# Patient Record
Sex: MALE | Race: WHITE | NOT HISPANIC OR LATINO
[De-identification: names, ages, dates, MRNs, and addresses within clinical notes are randomized per-mention and may not be internally consistent; named-entity substitution may affect disease eponyms.]

---

## 2017-02-24 ENCOUNTER — LABORATORY RESULT (OUTPATIENT)
Age: 82
End: 2017-02-24

## 2017-02-24 ENCOUNTER — APPOINTMENT (OUTPATIENT)
Dept: INTERNAL MEDICINE | Facility: CLINIC | Age: 82
End: 2017-02-24

## 2017-02-24 VITALS — DIASTOLIC BLOOD PRESSURE: 52 MMHG | SYSTOLIC BLOOD PRESSURE: 130 MMHG

## 2017-02-24 VITALS
BODY MASS INDEX: 29.76 KG/M2 | TEMPERATURE: 96.7 F | DIASTOLIC BLOOD PRESSURE: 60 MMHG | SYSTOLIC BLOOD PRESSURE: 130 MMHG | WEIGHT: 190 LBS

## 2017-02-24 DIAGNOSIS — N28.9 DISORDER OF KIDNEY AND URETER, UNSPECIFIED: ICD-10-CM

## 2017-03-04 LAB
ALBUMIN SERPL ELPH-MCNC: 3.8 G/DL
ALP BLD-CCNC: 61 U/L
ALT SERPL-CCNC: 16 U/L
ANION GAP SERPL CALC-SCNC: 14 MMOL/L
AST SERPL-CCNC: 18 U/L
BASOPHILS # BLD AUTO: 0.01 K/UL
BASOPHILS NFR BLD AUTO: 0.2 %
BILIRUB SERPL-MCNC: 0.4 MG/DL
BUN SERPL-MCNC: 41 MG/DL
CALCIUM SERPL-MCNC: 9.1 MG/DL
CHLORIDE SERPL-SCNC: 104 MMOL/L
CHOLEST SERPL-MCNC: 131 MG/DL
CHOLEST/HDLC SERPL: 2.7 RATIO
CO2 SERPL-SCNC: 24 MMOL/L
CREAT SERPL-MCNC: 1.61 MG/DL
EOSINOPHIL # BLD AUTO: 0.12 K/UL
EOSINOPHIL NFR BLD AUTO: 1.9 %
FERRITIN SERPL-MCNC: 153.8 NG/ML
FOLATE SERPL-MCNC: 13.9 NG/ML
GLUCOSE SERPL-MCNC: 119 MG/DL
HBA1C MFR BLD HPLC: 5.9 %
HCT VFR BLD CALC: 32.3 %
HDLC SERPL-MCNC: 48 MG/DL
HGB BLD-MCNC: 10.8 G/DL
IMM GRANULOCYTES NFR BLD AUTO: 0.2 %
IRON SATN MFR SERPL: 33 %
IRON SERPL-MCNC: 83 UG/DL
LDLC SERPL CALC-MCNC: 63 MG/DL
LDLC SERPL DIRECT ASSAY-MCNC: 72 MG/DL
LYMPHOCYTES # BLD AUTO: 1.2 K/UL
LYMPHOCYTES NFR BLD AUTO: 19.3 %
MAN DIFF?: NORMAL
MCHC RBC-ENTMCNC: 31 PG
MCHC RBC-ENTMCNC: 33.4 GM/DL
MCV RBC AUTO: 92.8 FL
MONOCYTES # BLD AUTO: 0.42 K/UL
MONOCYTES NFR BLD AUTO: 6.8 %
NEUTROPHILS # BLD AUTO: 4.46 K/UL
NEUTROPHILS NFR BLD AUTO: 71.6 %
PLATELET # BLD AUTO: 146 K/UL
POTASSIUM SERPL-SCNC: 5 MMOL/L
PROT SERPL-MCNC: 6.5 G/DL
RBC # BLD: 3.48 M/UL
RBC # FLD: 14.4 %
SODIUM SERPL-SCNC: 141 MMOL/L
T3 SERPL-MCNC: 107 NG/DL
T3RU NFR SERPL: 0.97 INDEX
T4 FREE SERPL-MCNC: 1.3 NG/DL
T4 SERPL-MCNC: 7.8 UG/DL
TIBC SERPL-MCNC: 251 UG/DL
TRIGL SERPL-MCNC: 98 MG/DL
TSH SERPL-ACNC: 2.16 UIU/ML
UIBC SERPL-MCNC: 168 UG/DL
VIT B12 SERPL-MCNC: 316 PG/ML
WBC # FLD AUTO: 6.22 K/UL

## 2017-04-20 ENCOUNTER — RX RENEWAL (OUTPATIENT)
Age: 82
End: 2017-04-20

## 2017-06-02 ENCOUNTER — APPOINTMENT (OUTPATIENT)
Dept: INTERNAL MEDICINE | Facility: CLINIC | Age: 82
End: 2017-06-02

## 2017-06-02 ENCOUNTER — LABORATORY RESULT (OUTPATIENT)
Age: 82
End: 2017-06-02

## 2017-06-02 VITALS
SYSTOLIC BLOOD PRESSURE: 120 MMHG | TEMPERATURE: 97.6 F | WEIGHT: 193 LBS | BODY MASS INDEX: 30.23 KG/M2 | DIASTOLIC BLOOD PRESSURE: 60 MMHG

## 2017-06-02 VITALS — SYSTOLIC BLOOD PRESSURE: 136 MMHG | DIASTOLIC BLOOD PRESSURE: 52 MMHG

## 2017-06-02 DIAGNOSIS — R05 COUGH: ICD-10-CM

## 2017-06-02 DIAGNOSIS — R97.20 ELEVATED PROSTATE, SPECIFIC ANTIGEN [PSA]: ICD-10-CM

## 2017-06-04 LAB
ALBUMIN SERPL ELPH-MCNC: 3.8 G/DL
ALP BLD-CCNC: 63 U/L
ALT SERPL-CCNC: 11 U/L
ANION GAP SERPL CALC-SCNC: 15 MMOL/L
AST SERPL-CCNC: 14 U/L
BASOPHILS # BLD AUTO: 0.01 K/UL
BASOPHILS NFR BLD AUTO: 0.1 %
BILIRUB SERPL-MCNC: 0.3 MG/DL
BUN SERPL-MCNC: 41 MG/DL
CALCIUM SERPL-MCNC: 8.9 MG/DL
CHLORIDE SERPL-SCNC: 104 MMOL/L
CHOLEST SERPL-MCNC: 127 MG/DL
CHOLEST/HDLC SERPL: 3.1 RATIO
CO2 SERPL-SCNC: 24 MMOL/L
CREAT SERPL-MCNC: 1.61 MG/DL
EOSINOPHIL # BLD AUTO: 0.15 K/UL
EOSINOPHIL NFR BLD AUTO: 2.1 %
GLUCOSE SERPL-MCNC: 116 MG/DL
HBA1C MFR BLD HPLC: 5.8 %
HCT VFR BLD CALC: 32.4 %
HDLC SERPL-MCNC: 41 MG/DL
HGB BLD-MCNC: 10.5 G/DL
IMM GRANULOCYTES NFR BLD AUTO: 0.3 %
LDLC SERPL CALC-MCNC: 64 MG/DL
LDLC SERPL DIRECT ASSAY-MCNC: 73 MG/DL
LYMPHOCYTES # BLD AUTO: 1.2 K/UL
LYMPHOCYTES NFR BLD AUTO: 17.2 %
MAN DIFF?: NORMAL
MCHC RBC-ENTMCNC: 30.1 PG
MCHC RBC-ENTMCNC: 32.4 GM/DL
MCV RBC AUTO: 92.8 FL
MONOCYTES # BLD AUTO: 0.39 K/UL
MONOCYTES NFR BLD AUTO: 5.6 %
NEUTROPHILS # BLD AUTO: 5.21 K/UL
NEUTROPHILS NFR BLD AUTO: 74.7 %
PLATELET # BLD AUTO: 159 K/UL
POTASSIUM SERPL-SCNC: 5.1 MMOL/L
PROT SERPL-MCNC: 6.5 G/DL
PSA SERPL-MCNC: 5.43 NG/ML
RBC # BLD: 3.49 M/UL
RBC # FLD: 14.5 %
SODIUM SERPL-SCNC: 143 MMOL/L
TRIGL SERPL-MCNC: 111 MG/DL
WBC # FLD AUTO: 6.98 K/UL

## 2017-06-10 ENCOUNTER — MESSAGE (OUTPATIENT)
Age: 82
End: 2017-06-10

## 2017-07-17 ENCOUNTER — RX RENEWAL (OUTPATIENT)
Age: 82
End: 2017-07-17

## 2017-07-27 ENCOUNTER — RX RENEWAL (OUTPATIENT)
Age: 82
End: 2017-07-27

## 2017-08-13 ENCOUNTER — RX RENEWAL (OUTPATIENT)
Age: 82
End: 2017-08-13

## 2017-09-08 ENCOUNTER — LABORATORY RESULT (OUTPATIENT)
Age: 82
End: 2017-09-08

## 2017-09-08 ENCOUNTER — APPOINTMENT (OUTPATIENT)
Dept: INTERNAL MEDICINE | Facility: CLINIC | Age: 82
End: 2017-09-08
Payer: COMMERCIAL

## 2017-09-08 VITALS
WEIGHT: 175 LBS | SYSTOLIC BLOOD PRESSURE: 130 MMHG | BODY MASS INDEX: 27.41 KG/M2 | DIASTOLIC BLOOD PRESSURE: 70 MMHG | TEMPERATURE: 98.2 F

## 2017-09-08 VITALS — SYSTOLIC BLOOD PRESSURE: 146 MMHG | DIASTOLIC BLOOD PRESSURE: 62 MMHG

## 2017-09-08 PROCEDURE — 36415 COLL VENOUS BLD VENIPUNCTURE: CPT

## 2017-09-08 PROCEDURE — 93000 ELECTROCARDIOGRAM COMPLETE: CPT

## 2017-09-08 PROCEDURE — 99214 OFFICE O/P EST MOD 30 MIN: CPT | Mod: 25

## 2017-09-08 PROCEDURE — 90686 IIV4 VACC NO PRSV 0.5 ML IM: CPT

## 2017-09-08 PROCEDURE — G0008: CPT

## 2017-09-08 PROCEDURE — 93040 RHYTHM ECG WITH REPORT: CPT | Mod: 59

## 2017-09-08 RX ORDER — AMOXICILLIN AND CLAVULANATE POTASSIUM 875; 125 MG/1; MG/1
875-125 TABLET, COATED ORAL
Qty: 14 | Refills: 1 | Status: DISCONTINUED | COMMUNITY
Start: 2017-06-10 | End: 2017-09-08

## 2017-10-13 ENCOUNTER — MESSAGE (OUTPATIENT)
Age: 82
End: 2017-10-13

## 2017-10-15 LAB
ALBUMIN SERPL ELPH-MCNC: 4 G/DL
ALP BLD-CCNC: 58 U/L
ALT SERPL-CCNC: 12 U/L
ANION GAP SERPL CALC-SCNC: 15 MMOL/L
AST SERPL-CCNC: 14 U/L
BASOPHILS # BLD AUTO: 0.01 K/UL
BASOPHILS NFR BLD AUTO: 0.1 %
BILIRUB SERPL-MCNC: 0.3 MG/DL
BUN SERPL-MCNC: 45 MG/DL
CALCIUM SERPL-MCNC: 9.2 MG/DL
CHLORIDE SERPL-SCNC: 107 MMOL/L
CHOLEST SERPL-MCNC: 119 MG/DL
CHOLEST/HDLC SERPL: 2.7 RATIO
CO2 SERPL-SCNC: 22 MMOL/L
CREAT SERPL-MCNC: 1.74 MG/DL
EOSINOPHIL # BLD AUTO: 0.12 K/UL
EOSINOPHIL NFR BLD AUTO: 1.8 %
GLUCOSE SERPL-MCNC: 118 MG/DL
HBA1C MFR BLD HPLC: 5.4 %
HCT VFR BLD CALC: 32.4 %
HDLC SERPL-MCNC: 44 MG/DL
HGB BLD-MCNC: 10.5 G/DL
IMM GRANULOCYTES NFR BLD AUTO: 0.1 %
LDLC SERPL CALC-MCNC: 60 MG/DL
LDLC SERPL DIRECT ASSAY-MCNC: 66 MG/DL
LYMPHOCYTES # BLD AUTO: 1.12 K/UL
LYMPHOCYTES NFR BLD AUTO: 16.6 %
MAN DIFF?: NORMAL
MCHC RBC-ENTMCNC: 30.3 PG
MCHC RBC-ENTMCNC: 32.4 GM/DL
MCV RBC AUTO: 93.6 FL
MONOCYTES # BLD AUTO: 0.41 K/UL
MONOCYTES NFR BLD AUTO: 6.1 %
NEUTROPHILS # BLD AUTO: 5.08 K/UL
NEUTROPHILS NFR BLD AUTO: 75.3 %
PLATELET # BLD AUTO: 182 K/UL
POTASSIUM SERPL-SCNC: 5.5 MMOL/L
PROT SERPL-MCNC: 6.8 G/DL
RBC # BLD: 3.46 M/UL
RBC # FLD: 14.5 %
SODIUM SERPL-SCNC: 144 MMOL/L
TRIGL SERPL-MCNC: 76 MG/DL
WBC # FLD AUTO: 6.75 K/UL

## 2017-11-01 ENCOUNTER — RX RENEWAL (OUTPATIENT)
Age: 82
End: 2017-11-01

## 2017-12-22 ENCOUNTER — LABORATORY RESULT (OUTPATIENT)
Age: 82
End: 2017-12-22

## 2017-12-22 ENCOUNTER — NON-APPOINTMENT (OUTPATIENT)
Age: 82
End: 2017-12-22

## 2017-12-22 ENCOUNTER — APPOINTMENT (OUTPATIENT)
Dept: INTERNAL MEDICINE | Facility: CLINIC | Age: 82
End: 2017-12-22

## 2017-12-22 ENCOUNTER — APPOINTMENT (OUTPATIENT)
Dept: CARDIOLOGY | Facility: CLINIC | Age: 82
End: 2017-12-22
Payer: COMMERCIAL

## 2017-12-22 VITALS
DIASTOLIC BLOOD PRESSURE: 60 MMHG | WEIGHT: 174 LBS | HEART RATE: 57 BPM | BODY MASS INDEX: 27.25 KG/M2 | SYSTOLIC BLOOD PRESSURE: 130 MMHG | OXYGEN SATURATION: 97 %

## 2017-12-22 LAB
BASOPHILS # BLD AUTO: 0.01 K/UL
BASOPHILS NFR BLD AUTO: 0.2 %
EOSINOPHIL # BLD AUTO: 0.1 K/UL
EOSINOPHIL NFR BLD AUTO: 1.6
HCT VFR BLD CALC: 35.4 %
HGB BLD-MCNC: 11.5 G/DL
IMM GRANULOCYTES NFR BLD AUTO: 0.2 %
LYMPHOCYTES # BLD AUTO: 1.23 K/UL
LYMPHOCYTES NFR BLD AUTO: 19.3 %
MAN DIFF?: NORMAL
MCHC RBC-ENTMCNC: 30.6 PG
MCHC RBC-ENTMCNC: 32.5 GM/DL
MCV RBC AUTO: 94.1 FL
MONOCYTES # BLD AUTO: 0.38 K/UL
MONOCYTES NFR BLD AUTO: 6 %
NEUTROPHILS # BLD AUTO: 4.64 K/UL
NEUTROPHILS NFR BLD AUTO: 72.7 %
PLATELET # BLD AUTO: 191 K/UL
RBC # BLD: 3.76 M/UL
RBC # FLD: 13.9 %
WBC # FLD AUTO: 6.37 K/UL

## 2017-12-22 PROCEDURE — 36415 COLL VENOUS BLD VENIPUNCTURE: CPT

## 2017-12-22 PROCEDURE — 93040 RHYTHM ECG WITH REPORT: CPT | Mod: 59

## 2017-12-22 PROCEDURE — 93306 TTE W/DOPPLER COMPLETE: CPT

## 2017-12-22 PROCEDURE — 93000 ELECTROCARDIOGRAM COMPLETE: CPT

## 2017-12-22 PROCEDURE — 99214 OFFICE O/P EST MOD 30 MIN: CPT

## 2017-12-23 ENCOUNTER — NON-APPOINTMENT (OUTPATIENT)
Age: 82
End: 2017-12-23

## 2018-01-04 LAB
ALBUMIN SERPL ELPH-MCNC: 3.9 G/DL
ALP BLD-CCNC: 60 U/L
ALT SERPL-CCNC: 12 U/L
ANION GAP SERPL CALC-SCNC: 13 MMOL/L
AST SERPL-CCNC: 16 U/L
BILIRUB SERPL-MCNC: 0.4 MG/DL
BUN SERPL-MCNC: 41 MG/DL
CALCIUM SERPL-MCNC: 9.1 MG/DL
CHLORIDE SERPL-SCNC: 106 MMOL/L
CHOLEST SERPL-MCNC: 114 MG/DL
CHOLEST/HDLC SERPL: 2.7 RATIO
CO2 SERPL-SCNC: 23 MMOL/L
CREAT SERPL-MCNC: 1.47 MG/DL
GLUCOSE SERPL-MCNC: 103 MG/DL
HBA1C MFR BLD HPLC: 5.6 %
HDLC SERPL-MCNC: 43 MG/DL
LDLC SERPL CALC-MCNC: 60 MG/DL
LDLC SERPL DIRECT ASSAY-MCNC: 61 MG/DL
POTASSIUM SERPL-SCNC: 4.8 MMOL/L
PROT SERPL-MCNC: 6.6 G/DL
SODIUM SERPL-SCNC: 142 MMOL/L
T3 SERPL-MCNC: 102 NG/DL
T3RU NFR SERPL: 1.02 INDEX
T4 FREE SERPL-MCNC: 1.3 NG/DL
T4 SERPL-MCNC: 8 UG/DL
TRIGL SERPL-MCNC: 57 MG/DL
TSH SERPL-ACNC: 2.42 UIU/ML

## 2018-03-08 ENCOUNTER — RX RENEWAL (OUTPATIENT)
Age: 83
End: 2018-03-08

## 2018-04-13 ENCOUNTER — APPOINTMENT (OUTPATIENT)
Dept: CARDIOLOGY | Facility: CLINIC | Age: 83
End: 2018-04-13
Payer: COMMERCIAL

## 2018-04-13 ENCOUNTER — NON-APPOINTMENT (OUTPATIENT)
Age: 83
End: 2018-04-13

## 2018-04-13 VITALS — SYSTOLIC BLOOD PRESSURE: 138 MMHG | DIASTOLIC BLOOD PRESSURE: 58 MMHG

## 2018-04-13 VITALS — SYSTOLIC BLOOD PRESSURE: 130 MMHG | OXYGEN SATURATION: 97 % | DIASTOLIC BLOOD PRESSURE: 60 MMHG | HEART RATE: 53 BPM

## 2018-04-13 DIAGNOSIS — I49.3 VENTRICULAR PREMATURE DEPOLARIZATION: ICD-10-CM

## 2018-04-13 DIAGNOSIS — E78.5 HYPERLIPIDEMIA, UNSPECIFIED: ICD-10-CM

## 2018-04-13 PROCEDURE — 36415 COLL VENOUS BLD VENIPUNCTURE: CPT

## 2018-04-13 PROCEDURE — 93040 RHYTHM ECG WITH REPORT: CPT | Mod: 59

## 2018-04-13 PROCEDURE — 93000 ELECTROCARDIOGRAM COMPLETE: CPT

## 2018-04-13 PROCEDURE — 99214 OFFICE O/P EST MOD 30 MIN: CPT

## 2018-05-06 LAB
ALBUMIN SERPL ELPH-MCNC: 3.9 G/DL
ALP BLD-CCNC: 64 U/L
ALT SERPL-CCNC: 15 U/L
ANION GAP SERPL CALC-SCNC: 11 MMOL/L
AST SERPL-CCNC: 16 U/L
BASOPHILS # BLD AUTO: 0.01 K/UL
BASOPHILS NFR BLD AUTO: 0.2 %
BILIRUB SERPL-MCNC: 0.4 MG/DL
BUN SERPL-MCNC: 37 MG/DL
CALCIUM SERPL-MCNC: 9.4 MG/DL
CHLORIDE SERPL-SCNC: 104 MMOL/L
CHOLEST SERPL-MCNC: 125 MG/DL
CHOLEST/HDLC SERPL: 2.4 RATIO
CO2 SERPL-SCNC: 25 MMOL/L
CREAT SERPL-MCNC: 1.65 MG/DL
EOSINOPHIL # BLD AUTO: 0.14 K/UL
EOSINOPHIL NFR BLD AUTO: 2.1 %
GLUCOSE SERPL-MCNC: 109 MG/DL
HBA1C MFR BLD HPLC: 5.5 %
HCT VFR BLD CALC: 33.6 %
HDLC SERPL-MCNC: 53 MG/DL
HGB BLD-MCNC: 11.2 G/DL
IMM GRANULOCYTES NFR BLD AUTO: 0.2 %
LDLC SERPL CALC-MCNC: 58 MG/DL
LDLC SERPL DIRECT ASSAY-MCNC: 67 MG/DL
LYMPHOCYTES # BLD AUTO: 1.13 K/UL
LYMPHOCYTES NFR BLD AUTO: 17.3 %
MAN DIFF?: NORMAL
MCHC RBC-ENTMCNC: 31.1 PG
MCHC RBC-ENTMCNC: 33.3 GM/DL
MCV RBC AUTO: 93.3 FL
MONOCYTES # BLD AUTO: 0.47 K/UL
MONOCYTES NFR BLD AUTO: 7.2 %
NEUTROPHILS # BLD AUTO: 4.79 K/UL
NEUTROPHILS NFR BLD AUTO: 73 %
PLATELET # BLD AUTO: 146 K/UL
POTASSIUM SERPL-SCNC: 5.5 MMOL/L
PROT SERPL-MCNC: 6.6 G/DL
RBC # BLD: 3.6 M/UL
RBC # FLD: 14.1 %
SODIUM SERPL-SCNC: 140 MMOL/L
TRIGL SERPL-MCNC: 68 MG/DL
WBC # FLD AUTO: 6.55 K/UL

## 2018-05-07 ENCOUNTER — MEDICATION RENEWAL (OUTPATIENT)
Age: 83
End: 2018-05-07

## 2018-05-09 ENCOUNTER — RX RENEWAL (OUTPATIENT)
Age: 83
End: 2018-05-09

## 2018-06-14 ENCOUNTER — NON-APPOINTMENT (OUTPATIENT)
Age: 83
End: 2018-06-14

## 2018-07-13 ENCOUNTER — RX RENEWAL (OUTPATIENT)
Age: 83
End: 2018-07-13

## 2018-08-10 ENCOUNTER — NON-APPOINTMENT (OUTPATIENT)
Age: 83
End: 2018-08-10

## 2018-08-10 ENCOUNTER — APPOINTMENT (OUTPATIENT)
Dept: CARDIOLOGY | Facility: CLINIC | Age: 83
End: 2018-08-10
Payer: COMMERCIAL

## 2018-08-10 VITALS
WEIGHT: 183 LBS | SYSTOLIC BLOOD PRESSURE: 140 MMHG | HEART RATE: 58 BPM | BODY MASS INDEX: 28.66 KG/M2 | DIASTOLIC BLOOD PRESSURE: 60 MMHG | OXYGEN SATURATION: 100 %

## 2018-08-10 VITALS — SYSTOLIC BLOOD PRESSURE: 124 MMHG | DIASTOLIC BLOOD PRESSURE: 55 MMHG

## 2018-08-10 DIAGNOSIS — R06.00 DYSPNEA, UNSPECIFIED: ICD-10-CM

## 2018-08-10 DIAGNOSIS — R73.09 OTHER ABNORMAL GLUCOSE: ICD-10-CM

## 2018-08-10 DIAGNOSIS — R00.1 BRADYCARDIA, UNSPECIFIED: ICD-10-CM

## 2018-08-10 DIAGNOSIS — I34.0 NONRHEUMATIC MITRAL (VALVE) INSUFFICIENCY: ICD-10-CM

## 2018-08-10 DIAGNOSIS — R60.9 EDEMA, UNSPECIFIED: ICD-10-CM

## 2018-08-10 LAB
BASOPHILS # BLD AUTO: 0.02 K/UL
BASOPHILS NFR BLD AUTO: 0.3 %
EOSINOPHIL # BLD AUTO: 0.17 K/UL
EOSINOPHIL NFR BLD AUTO: 2.4 %
HCT VFR BLD CALC: 34.4 %
HGB BLD-MCNC: 11.1 G/DL
IMM GRANULOCYTES NFR BLD AUTO: 0.1 %
LYMPHOCYTES # BLD AUTO: 1.42 K/UL
LYMPHOCYTES NFR BLD AUTO: 20.3 %
MAN DIFF?: NORMAL
MCHC RBC-ENTMCNC: 30.2 PG
MCHC RBC-ENTMCNC: 32.3 GM/DL
MCV RBC AUTO: 93.5 FL
MONOCYTES # BLD AUTO: 0.45 K/UL
MONOCYTES NFR BLD AUTO: 6.4 %
NEUTROPHILS # BLD AUTO: 4.92 K/UL
NEUTROPHILS NFR BLD AUTO: 70.5 %
PLATELET # BLD AUTO: 178 K/UL
RBC # BLD: 3.68 M/UL
RBC # FLD: 14.2 %
WBC # FLD AUTO: 6.99 K/UL

## 2018-08-10 PROCEDURE — 99214 OFFICE O/P EST MOD 30 MIN: CPT

## 2018-08-10 PROCEDURE — 93000 ELECTROCARDIOGRAM COMPLETE: CPT

## 2018-08-10 PROCEDURE — 36415 COLL VENOUS BLD VENIPUNCTURE: CPT

## 2018-08-17 ENCOUNTER — MEDICATION RENEWAL (OUTPATIENT)
Age: 83
End: 2018-08-17

## 2018-08-18 ENCOUNTER — RX RENEWAL (OUTPATIENT)
Age: 83
End: 2018-08-18

## 2018-08-18 ENCOUNTER — MEDICATION RENEWAL (OUTPATIENT)
Age: 83
End: 2018-08-18

## 2018-08-29 ENCOUNTER — NON-APPOINTMENT (OUTPATIENT)
Age: 83
End: 2018-08-29

## 2018-08-31 LAB
ALBUMIN SERPL ELPH-MCNC: 3.9 G/DL
ALP BLD-CCNC: 84 U/L
ALT SERPL-CCNC: 18 U/L
ANION GAP SERPL CALC-SCNC: 14 MMOL/L
AST SERPL-CCNC: 15 U/L
BILIRUB SERPL-MCNC: 0.2 MG/DL
BUN SERPL-MCNC: 40 MG/DL
CALCIUM SERPL-MCNC: 9 MG/DL
CHLORIDE SERPL-SCNC: 102 MMOL/L
CHOLEST SERPL-MCNC: 157 MG/DL
CHOLEST/HDLC SERPL: 3.7 RATIO
CO2 SERPL-SCNC: 24 MMOL/L
CREAT SERPL-MCNC: 1.56 MG/DL
GLUCOSE SERPL-MCNC: 117 MG/DL
HDLC SERPL-MCNC: 42 MG/DL
LDLC SERPL CALC-MCNC: 93 MG/DL
LDLC SERPL DIRECT ASSAY-MCNC: 99 MG/DL
NT-PROBNP SERPL-MCNC: 395 PG/ML
POTASSIUM SERPL-SCNC: 5.4 MMOL/L
PROT SERPL-MCNC: 6.5 G/DL
SODIUM SERPL-SCNC: 140 MMOL/L
TRIGL SERPL-MCNC: 108 MG/DL

## 2018-09-21 ENCOUNTER — APPOINTMENT (OUTPATIENT)
Dept: CARDIOLOGY | Facility: CLINIC | Age: 83
End: 2018-09-21
Payer: COMMERCIAL

## 2018-09-21 PROCEDURE — 93306 TTE W/DOPPLER COMPLETE: CPT

## 2018-09-24 ENCOUNTER — MESSAGE (OUTPATIENT)
Age: 83
End: 2018-09-24

## 2018-10-30 ENCOUNTER — MEDICATION RENEWAL (OUTPATIENT)
Age: 83
End: 2018-10-30

## 2018-11-02 ENCOUNTER — MEDICATION RENEWAL (OUTPATIENT)
Age: 83
End: 2018-11-02

## 2018-11-02 ENCOUNTER — RX RENEWAL (OUTPATIENT)
Age: 83
End: 2018-11-02

## 2018-11-30 ENCOUNTER — APPOINTMENT (OUTPATIENT)
Dept: CARDIOLOGY | Facility: CLINIC | Age: 83
End: 2018-11-30
Payer: COMMERCIAL

## 2018-11-30 ENCOUNTER — LABORATORY RESULT (OUTPATIENT)
Age: 83
End: 2018-11-30

## 2018-11-30 DIAGNOSIS — E87.5 HYPERKALEMIA: ICD-10-CM

## 2018-11-30 DIAGNOSIS — E78.00 PURE HYPERCHOLESTEROLEMIA, UNSPECIFIED: ICD-10-CM

## 2018-11-30 DIAGNOSIS — I10 ESSENTIAL (PRIMARY) HYPERTENSION: ICD-10-CM

## 2018-11-30 DIAGNOSIS — D64.9 ANEMIA, UNSPECIFIED: ICD-10-CM

## 2018-11-30 DIAGNOSIS — I47.2 VENTRICULAR TACHYCARDIA: ICD-10-CM

## 2018-11-30 DIAGNOSIS — N18.9 CHRONIC KIDNEY DISEASE, UNSPECIFIED: ICD-10-CM

## 2018-11-30 PROCEDURE — 93351 STRESS TTE COMPLETE: CPT

## 2018-11-30 PROCEDURE — 99215 OFFICE O/P EST HI 40 MIN: CPT | Mod: 25

## 2018-11-30 PROCEDURE — 36415 COLL VENOUS BLD VENIPUNCTURE: CPT

## 2018-12-07 ENCOUNTER — INPATIENT (INPATIENT)
Facility: HOSPITAL | Age: 83
LOS: 9 days | Discharge: HOME | DRG: 233 | End: 2018-12-17
Attending: THORACIC SURGERY (CARDIOTHORACIC VASCULAR SURGERY) | Admitting: INTERNAL MEDICINE
Payer: COMMERCIAL

## 2018-12-07 VITALS
HEART RATE: 59 BPM | TEMPERATURE: 98 F | HEIGHT: 66 IN | DIASTOLIC BLOOD PRESSURE: 96 MMHG | OXYGEN SATURATION: 100 % | SYSTOLIC BLOOD PRESSURE: 163 MMHG | WEIGHT: 179.02 LBS | RESPIRATION RATE: 16 BRPM

## 2018-12-07 DIAGNOSIS — R94.39 ABNORMAL RESULT OF OTHER CARDIOVASCULAR FUNCTION STUDY: ICD-10-CM

## 2018-12-07 LAB
ALBUMIN SERPL ELPH-MCNC: 4.1 G/DL — SIGNIFICANT CHANGE UP (ref 3.3–5)
ALP SERPL-CCNC: 90 U/L — SIGNIFICANT CHANGE UP (ref 40–120)
ALT FLD-CCNC: 13 U/L — SIGNIFICANT CHANGE UP (ref 10–45)
ANION GAP SERPL CALC-SCNC: 13 MMOL/L — SIGNIFICANT CHANGE UP (ref 5–17)
AST SERPL-CCNC: 14 U/L — SIGNIFICANT CHANGE UP (ref 10–40)
BILIRUB SERPL-MCNC: 0.4 MG/DL — SIGNIFICANT CHANGE UP (ref 0.2–1.2)
BUN SERPL-MCNC: 34 MG/DL — HIGH (ref 7–23)
CALCIUM SERPL-MCNC: 9 MG/DL — SIGNIFICANT CHANGE UP (ref 8.4–10.5)
CHLORIDE SERPL-SCNC: 104 MMOL/L — SIGNIFICANT CHANGE UP (ref 96–108)
CO2 SERPL-SCNC: 24 MMOL/L — SIGNIFICANT CHANGE UP (ref 22–31)
CREAT SERPL-MCNC: 1.53 MG/DL — HIGH (ref 0.5–1.3)
GLUCOSE SERPL-MCNC: 124 MG/DL — HIGH (ref 70–99)
HCT VFR BLD CALC: 31.8 % — LOW (ref 39–50)
HGB BLD-MCNC: 11.2 G/DL — LOW (ref 13–17)
MCHC RBC-ENTMCNC: 31.3 PG — SIGNIFICANT CHANGE UP (ref 27–34)
MCHC RBC-ENTMCNC: 35.2 GM/DL — SIGNIFICANT CHANGE UP (ref 32–36)
MCV RBC AUTO: 88.8 FL — SIGNIFICANT CHANGE UP (ref 80–100)
PLATELET # BLD AUTO: 181 K/UL — SIGNIFICANT CHANGE UP (ref 150–400)
POTASSIUM SERPL-MCNC: 4.7 MMOL/L — SIGNIFICANT CHANGE UP (ref 3.5–5.3)
POTASSIUM SERPL-SCNC: 4.7 MMOL/L — SIGNIFICANT CHANGE UP (ref 3.5–5.3)
PROT SERPL-MCNC: 6.9 G/DL — SIGNIFICANT CHANGE UP (ref 6–8.3)
RBC # BLD: 3.58 M/UL — LOW (ref 4.2–5.8)
RBC # FLD: 12.8 % — SIGNIFICANT CHANGE UP (ref 10.3–14.5)
SODIUM SERPL-SCNC: 141 MMOL/L — SIGNIFICANT CHANGE UP (ref 135–145)
WBC # BLD: 6.4 K/UL — SIGNIFICANT CHANGE UP (ref 3.8–10.5)
WBC # FLD AUTO: 6.4 K/UL — SIGNIFICANT CHANGE UP (ref 3.8–10.5)

## 2018-12-07 PROCEDURE — ZZZZZ: CPT

## 2018-12-07 PROCEDURE — 99152 MOD SED SAME PHYS/QHP 5/>YRS: CPT | Mod: GC

## 2018-12-07 PROCEDURE — 93458 L HRT ARTERY/VENTRICLE ANGIO: CPT | Mod: 26,GC

## 2018-12-07 PROCEDURE — 99223 1ST HOSP IP/OBS HIGH 75: CPT

## 2018-12-07 PROCEDURE — 93010 ELECTROCARDIOGRAM REPORT: CPT

## 2018-12-07 RX ORDER — ASPIRIN/CALCIUM CARB/MAGNESIUM 324 MG
81 TABLET ORAL DAILY
Qty: 0 | Refills: 0 | Status: DISCONTINUED | OUTPATIENT
Start: 2018-12-07 | End: 2018-12-11

## 2018-12-07 RX ORDER — METOPROLOL TARTRATE 50 MG
100 TABLET ORAL DAILY
Qty: 0 | Refills: 0 | Status: DISCONTINUED | OUTPATIENT
Start: 2018-12-07 | End: 2018-12-11

## 2018-12-07 RX ORDER — SODIUM CHLORIDE 9 MG/ML
250 INJECTION INTRAMUSCULAR; INTRAVENOUS; SUBCUTANEOUS ONCE
Qty: 0 | Refills: 0 | Status: DISCONTINUED | OUTPATIENT
Start: 2018-12-07 | End: 2018-12-11

## 2018-12-07 RX ORDER — DOXAZOSIN MESYLATE 4 MG
4 TABLET ORAL AT BEDTIME
Qty: 0 | Refills: 0 | Status: DISCONTINUED | OUTPATIENT
Start: 2018-12-07 | End: 2018-12-11

## 2018-12-07 RX ORDER — AMLODIPINE BESYLATE 2.5 MG/1
10 TABLET ORAL DAILY
Qty: 0 | Refills: 0 | Status: DISCONTINUED | OUTPATIENT
Start: 2018-12-07 | End: 2018-12-11

## 2018-12-07 RX ORDER — ATORVASTATIN CALCIUM 80 MG/1
20 TABLET, FILM COATED ORAL AT BEDTIME
Qty: 0 | Refills: 0 | Status: DISCONTINUED | OUTPATIENT
Start: 2018-12-07 | End: 2018-12-08

## 2018-12-07 RX ADMIN — AMLODIPINE BESYLATE 10 MILLIGRAM(S): 2.5 TABLET ORAL at 16:38

## 2018-12-07 RX ADMIN — Medication 4 MILLIGRAM(S): at 22:28

## 2018-12-07 RX ADMIN — Medication 81 MILLIGRAM(S): at 16:42

## 2018-12-07 RX ADMIN — Medication 100 MILLIGRAM(S): at 16:43

## 2018-12-07 RX ADMIN — ATORVASTATIN CALCIUM 20 MILLIGRAM(S): 80 TABLET, FILM COATED ORAL at 22:28

## 2018-12-07 NOTE — CONSULT NOTE ADULT - ASSESSMENT
Assessment:  84y Male presents with     Plan: Assessment:  83 y/o  male, former cigarette smoker - remote past with PMHx of HTN, HLD, BPH and Basal Cell Carcinoma of the skin presently with lesion on the LLE scheduled for excision, offering c/o exertional dyspnea (possible anginal equivalent) x 2 months.  He was evaluated by his Cardiologist - Dr. Connors and had stress/echo on 11/30/2018 revealing inferolateral wall, apical inferior wall and apical anterior wall that do not augment with exercise.  Patient is now for [facility] for which he presents today. Prelim cardiac cath reveals multiple vessel disease. Currently asymptomatic and denies any chest pain. CT surgery consulted for eval.     Plan:  Dr. Prasad to perform IVUS on Monday 12/10/18 to further re-eval revascularization strategy.

## 2018-12-07 NOTE — CHART NOTE - NSCHARTNOTEFT_GEN_A_CORE
Transfer note:    Patient is an 84 year old  male ex smoker (quit a few years ago) with HTN, HLD, BPH and basal cell carcinoma (left lower extremity) presented for an elective left heart cardiac catheterization. Patient with dyspnea on exertion for the last two months. He was seen by his cardiology Dr. Connors and had a stress echocardiogram on 11/30 that revealed inferolateral wall, apical inferior wall and apical anterior wall that do not augment with exercise. Echocardiogram from 9/2018 shows EF 70% with mild mitral regurgitation.     Home Medications:  Aspirin Enteric Coated 81 mg oral delayed release tablet: 1 tab(s) orally once a day (07 Dec 2018 18:00)  atorvastatin 20 mg oral tablet: 1 tab(s) orally once a day (07 Dec 2018 18:00)  doxazosin 4 mg oral tablet: 1 tab(s) orally once a day (07 Dec 2018 18:00)  felodipine 5 mg oral tablet, extended release: 1 tab(s) orally once a day (07 Dec 2018 18:00)  hydroCHLOROthiazide 25 mg oral tablet: 1 tab(s) orally once a day (07 Dec 2018 18:00)  lisinopril-hydroCHLOROthiazide 20 mg-12.5 mg oral tablet: 1 tab(s) orally once a day (07 Dec 2018 18:00)  Toprol- mg oral tablet, extended release: 1 tab(s) orally once a day (07 Dec 2018 18:00)      PAST MEDICAL & SURGICAL HISTORY:  Basal cell carcinoma (BCC) of skin of left lower extremity including hip  Benign prostatic hyperplasia, unspecified whether lower urinary tract symptoms present  Hyperlipidemia, unspecified hyperlipidemia type  Essential hypertension  No significant past surgical history      Review of Systems:   CONSTITUTIONAL: No fever, weight loss, or fatigue  EYES: No eye pain, visual disturbances, or discharge  RESPIRATORY: No cough, wheezing, chills or hemoptysis; No shortness of breath  CARDIOVASCULAR: No chest pain, palpitations, dizziness, or leg swelling  GASTROINTESTINAL: No abdominal or epigastric pain. No nausea, vomiting, or hematemesis; No diarrhea or constipation.  NEUROLOGICAL: No headaches, memory loss, loss of strength, numbness, or tremors  SKIN: No itching, burning, rashes, or lesions   MUSCULOSKELETAL: No joint pain or swelling; No muscle, back, or extremity pain      Allergies: No Known Allergies    Social History: ex smoker, denies etoh or drug use     FAMILY HISTORY: HTN in father    MEDICATIONS  (STANDING):  amLODIPine   Tablet 10 milliGRAM(s) Oral daily  aspirin enteric coated 81 milliGRAM(s) Oral daily  atorvastatin 20 milliGRAM(s) Oral at bedtime  doxazosin 4 milliGRAM(s) Oral at bedtime  metoprolol succinate  milliGRAM(s) Oral daily  sodium chloride 0.9% Bolus 250 milliLiter(s) IV Bolus once    MEDICATIONS  (PRN):      Vital Signs Last 24 Hrs  T(C): 36.4 (07 Dec 2018 11:59), Max: 36.4 (07 Dec 2018 11:59)  T(F): 97.5 (07 Dec 2018 11:59), Max: 97.5 (07 Dec 2018 11:59)  HR: 59 (07 Dec 2018 11:59) (59 - 59)  BP: 163/96 (07 Dec 2018 11:59) (163/96 - 163/96)  BP(mean): 118 (07 Dec 2018 11:59) (118 - 118)  RR: 16 (07 Dec 2018 11:59) (16 - 16)  SpO2: 100% (07 Dec 2018 11:59) (100% - 100%)  CAPILLARY BLOOD GLUCOSE      PHYSICAL EXAM:  GENERAL: NAD, well-developed  HEAD:  Atraumatic, Normocephalic  EYES: EOMI, PERRLA, conjunctiva and sclera clear  NECK: Supple, No JVD  CHEST/LUNG: Clear to auscultation bilaterally; No wheeze  HEART: Regular rate and rhythm; No murmurs, rubs, or gallops  ABDOMEN: Soft, Nontender, Nondistended; Bowel sounds present  EXTREMITIES:  2+ Peripheral Pulses, No clubbing, cyanosis; trace edema in ankles   PSYCH: AAOx3  NEUROLOGY: non-focal  SKIN: No rashes or lesions    LABS:                        11.2   6.4   )-----------( 181      ( 07 Dec 2018 12:16 )             31.8     12-07    141  |  104  |  34<H>  ----------------------------<  124<H>  4.7   |  24  |  1.53<H>    Ca    9.0      07 Dec 2018 12:16    TPro  6.9  /  Alb  4.1  /  TBili  0.4  /  DBili  x   /  AST  14  /  ALT  13  /  AlkPhos  90  12-07      RADIOLOGY & ADDITIONAL TESTS:  EKG sinus with no acute st-t changes       Patient is an 84 year old  male ex smoker (quit a few years ago) with HTN, HLD, BPH and basal cell carcinoma (left lower extremity) presented for an elective left heart cardiac catheterization. 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 To have IVUS Monday and to be seen by CTS, to determine if patient needs CABG or amenable to stenting.     # HTN- bp uncontrolled/ CAD/ HLD   - c/w amlodipine, cardura and metoprolol  - c/w statin  - c/w aspirin   - vitals q4h  - IVUS monday--> to either then have cabg vs repeat LHC with stenting  - CTS to eval     # ?CKD- baseline cr not known  - to be given  cc bolus once  - repeat bmp in the am     # communication  - discussed with wife, son and son in law at bedside Transfer note:    Patient is an 84 year old  male ex smoker (quit a few years ago) with HTN, HLD, BPH and basal cell carcinoma (left lower extremity) presented for an elective left heart cardiac catheterization. Patient with dyspnea on exertion for the last two months. He was seen by his cardiology Dr. Connors and had a stress echocardiogram on 11/30 that revealed inferolateral wall, apical inferior wall and apical anterior wall that do not augment with exercise. Echocardiogram from 9/2018 shows EF 70% with mild mitral regurgitation.     Home Medications:  Aspirin Enteric Coated 81 mg oral delayed release tablet: 1 tab(s) orally once a day (07 Dec 2018 18:00)  atorvastatin 20 mg oral tablet: 1 tab(s) orally once a day (07 Dec 2018 18:00)  doxazosin 4 mg oral tablet: 1 tab(s) orally once a day (07 Dec 2018 18:00)  felodipine 5 mg oral tablet, extended release: 1 tab(s) orally once a day (07 Dec 2018 18:00)  hydroCHLOROthiazide 25 mg oral tablet: 1 tab(s) orally once a day (07 Dec 2018 18:00)  lisinopril-hydroCHLOROthiazide 20 mg-12.5 mg oral tablet: 1 tab(s) orally once a day (07 Dec 2018 18:00)  Toprol- mg oral tablet, extended release: 1 tab(s) orally once a day (07 Dec 2018 18:00)      PAST MEDICAL & SURGICAL HISTORY:  Basal cell carcinoma (BCC) of skin of left lower extremity including hip  Benign prostatic hyperplasia, unspecified whether lower urinary tract symptoms present  Hyperlipidemia, unspecified hyperlipidemia type  Essential hypertension  No significant past surgical history      Review of Systems:   CONSTITUTIONAL: No fever, weight loss, or fatigue  EYES: No eye pain, visual disturbances, or discharge  RESPIRATORY: No cough, wheezing, chills or hemoptysis; No shortness of breath  CARDIOVASCULAR: No chest pain, palpitations, dizziness, or leg swelling  GASTROINTESTINAL: No abdominal or epigastric pain. No nausea, vomiting, or hematemesis; No diarrhea or constipation.  NEUROLOGICAL: No headaches, memory loss, loss of strength, numbness, or tremors  SKIN: No itching, burning, rashes, or lesions   MUSCULOSKELETAL: No joint pain or swelling; No muscle, back, or extremity pain      Allergies: No Known Allergies    Social History: ex smoker, denies etoh or drug use     FAMILY HISTORY: HTN in father    MEDICATIONS  (STANDING):  amLODIPine   Tablet 10 milliGRAM(s) Oral daily  aspirin enteric coated 81 milliGRAM(s) Oral daily  atorvastatin 20 milliGRAM(s) Oral at bedtime  doxazosin 4 milliGRAM(s) Oral at bedtime  metoprolol succinate  milliGRAM(s) Oral daily  sodium chloride 0.9% Bolus 250 milliLiter(s) IV Bolus once    MEDICATIONS  (PRN):      Vital Signs Last 24 Hrs  T(C): 36.4 (07 Dec 2018 11:59), Max: 36.4 (07 Dec 2018 11:59)  T(F): 97.5 (07 Dec 2018 11:59), Max: 97.5 (07 Dec 2018 11:59)  HR: 59 (07 Dec 2018 11:59) (59 - 59)  BP: 163/96 (07 Dec 2018 11:59) (163/96 - 163/96)  BP(mean): 118 (07 Dec 2018 11:59) (118 - 118)  RR: 16 (07 Dec 2018 11:59) (16 - 16)  SpO2: 100% (07 Dec 2018 11:59) (100% - 100%)  CAPILLARY BLOOD GLUCOSE      PHYSICAL EXAM:  GENERAL: NAD, well-developed  HEAD:  Atraumatic, Normocephalic  EYES: EOMI, PERRLA, conjunctiva and sclera clear  NECK: Supple, No JVD  CHEST/LUNG: Clear to auscultation bilaterally; No wheeze  HEART: Regular rate and rhythm; No murmurs, rubs, or gallops  ABDOMEN: Soft, Nontender, Nondistended; Bowel sounds present  EXTREMITIES:  2+ Peripheral Pulses, No clubbing, cyanosis; trace edema in ankles   PSYCH: AAOx3  NEUROLOGY: non-focal  SKIN: No rashes or lesions    LABS:                        11.2   6.4   )-----------( 181      ( 07 Dec 2018 12:16 )             31.8     12-07    141  |  104  |  34<H>  ----------------------------<  124<H>  4.7   |  24  |  1.53<H>    Ca    9.0      07 Dec 2018 12:16    TPro  6.9  /  Alb  4.1  /  TBili  0.4  /  DBili  x   /  AST  14  /  ALT  13  /  AlkPhos  90  12-07      RADIOLOGY & ADDITIONAL TESTS:  EKG sinus with no acute st-t changes       Patient is an 84 year old  male ex smoker (quit a few years ago) with HTN, HLD, BPH and basal cell carcinoma (left lower extremity) presented for an elective left heart cardiac catheterization. 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 To have IVUS Monday and to be seen by CTS, to determine if patient needs CABG or amenable to stenting.     # HTN- bp uncontrolled/ CAD/ HLD   - c/w amlodipine, cardura and metoprolol  - c/w statin  - c/w aspirin   - vitals q4h  - IVUS monday--> to either then have cabg vs repeat LHC with stenting  - CTS to eval     # ?CKD- baseline cr not known  - to be given  cc bolus once  - repeat bmp in the am     # communication  - discussed with wife, son and son in law at bedside    signed out to ministerio Lockett

## 2018-12-07 NOTE — H&P CARDIOLOGY - HISTORY OF PRESENT ILLNESS
85 y/o  male, former cigarette smoker - remote past < 5PY with PMHx of HTN, HLD and BPH offering c/o exertional dyspnea (possible anginal equivalent) x 2 months.  He was evaluated by his Cardiologist - Dr. Connors and had stress/echo on 11/30/2018 revealing inferolateral wall, apical inferior wall and apical anterior wall that do not augment with exercise.  Patient is now for [facility] for which he presents today. 83 y/o  male, former cigarette smoker - remote past < 5PY with PMHx of HTN, HLD, BPH and Basal Cell Carcinoma of the skin presently with lesion on the LLE scheduled for excision, offering c/o exertional dyspnea (possible anginal equivalent) x 2 months.  He was evaluated by his Cardiologist - Dr. Connors and had stress/echo on 11/30/2018 revealing inferolateral wall, apical inferior wall and apical anterior wall that do not augment with exercise.  Patient is now for [facility] for which he presents today. 85 y/o  male, former cigarette smoker - remote past < 5PY with PMHx of HTN, HLD, BPH and Basal Cell Carcinoma of the skin presently with lesion on the LLE scheduled for excision, offering c/o exertional dyspnea (possible anginal equivalent) x 2 months.  He was evaluated by his Cardiologist - Dr. Connors and had stress/echo on 11/30/2018 revealing inferolateral wall, apical inferior wall and apical anterior wall that do not augment with exercise.  Patient is now for [facility] for which he presents today.      TTE 9/2018 LVEF 65-70%, Mild MR

## 2018-12-07 NOTE — CONSULT NOTE ADULT - SUBJECTIVE AND OBJECTIVE BOX
HPI:  85 y/o  male, former cigarette smoker - remote past < 5PY with PMHx of HTN, HLD, BPH and Basal Cell Carcinoma of the skin presently with lesion on the LLE scheduled for excision, offering c/o exertional dyspnea (possible anginal equivalent) x 2 months.  He was evaluated by his Cardiologist - Dr. Connors and had stress/echo on 2018 revealing inferolateral wall, apical inferior wall and apical anterior wall that do not augment with exercise.  Patient is now for [facility] for which he presents today.      TTE 2018 LVEF 65-70%, Mild MR (07 Dec 2018 11:59)    CT Surgery consulted for CABG eval    Past Medical History  Basal cell carcinoma (BCC) of skin of left lower extremity including hip  Benign prostatic hyperplasia, unspecified whether lower urinary tract symptoms present  Hyperlipidemia, unspecified hyperlipidemia type  Essential hypertension      Past Surgical History  No significant past surgical history      MEDICATIONS  (STANDING):  amLODIPine   Tablet 10 milliGRAM(s) Oral daily  aspirin enteric coated 81 milliGRAM(s) Oral daily  atorvastatin 20 milliGRAM(s) Oral at bedtime  doxazosin 4 milliGRAM(s) Oral at bedtime  metoprolol succinate  milliGRAM(s) Oral daily  sodium chloride 0.9% Bolus 250 milliLiter(s) IV Bolus once    MEDICATIONS  (PRN):      Vital Signs Last 24 Hrs  T(C): 36.4 (18 @ 11:59), Max: 36.4 (18 @ 11:59)  T(F): 97.5 (18 @ 11:59), Max: 97.5 (18 @ 11:59)  HR: 59 (18 @ 11:59) (59 - 59)  BP: 163/96 (18 @ 11:59) (163/96 - 163/96)  RR: 16 (18 @ 11:59) (16 - 16)  SpO2: 100% (18 @ 11:59) (100% - 100%)           Daily Height in cm: 167.64 (07 Dec 2018 11:59)    Daily Weight in k.2 (07 Dec 2018 11:59)  Admit Wt: Drug Dosing Weight  Height (cm): 167.64 (07 Dec 2018 12:29)  Weight (kg): 81.2 (07 Dec 2018 12:29)  BMI (kg/m2): 28.9 (07 Dec 2018 12:29)  BSA (m2): 1.91 (07 Dec 2018 12:29)    Allergies: No Known Allergies      SOCIAL HISTORY:  Smoker: [ ] Yes  [ ] No        PACK YEARS:                         WHEN QUIT?  ETOH use: [ ] Yes  [ ] No              FREQUENCY / QUANTITY:  Ilicit Drug use:  [ ] Yes  [ ] No      Relevant Family History  FAMILY HISTORY:  No pertinent family history in first degree relatives      Review of Systems  GENERAL:  no weakness, fatigue, fevers or chills  NEURO: no dizziness, numbness, tingling or weakness  SKIN: no itching, burning, rashes, or lesions   HEENT: no visual changes;  no headache, no vertigo, no recent colds  RESPIRATORY: endorses shortness of breath on exertion, no cough, sputum, wheezing, hemoptysis  CARDIOVASCULAR:  no chest pain,  or palpitations  GI: no abd pain. no N/V/D.  PERIPHERAL VASCULAR: no swelling, no tenderness, no erythema, no varicose veins.     PHYSICAL EXAM  General: Well nourished, well developed, NAD.                                              Neuro: Normal exam oriented to person/place & time with no focal motor or sensory  deficits.                    ENT: Normal exam of nasal/oral mucosa with absence of cyanosis.   Neck: Normal exam of jugular veins, trachea & thyroid   Chest: Normal lung exam with good air movement absence of wheezes, rales, or rhonchi         Sternum: Normal                                                               CV:  Auscultation: normal S1S2, no murmurs  Carotids: No Bruits,  Abdominal Aorta: normal                                                                        GI: Normal exam of abdomen with no noted masses or tenderness. +[id number]                                                                                            Extremities: Normal no evidence of cyanosis or deformity, Edema: none  Lower Extremity Pulses: BL +2 Varicosities[+/- ]  SKIN : Normal exam to inspection & palpation.                                                           LABS:                        11.2   6.4   )-----------( 181      ( 07 Dec 2018 12:16 )             31.8     12-07    141  |  104  |  34<H>  ----------------------------<  124<H>  4.7   |  24  |  1.53<H>    Ca    9.0      07 Dec 2018 12:16    TPro  6.9  /  Alb  4.1  /  TBili  0.4  /  DBili  x   /  AST  14  /  ALT  13  /  AlkPhos  90  12-07          Cardiac Cath: pending official results    TTE / PAUL: HPI:  85 y/o  male, former cigarette smoker - remote past < 5PY with PMHx of HTN, HLD, BPH and Basal Cell Carcinoma of the skin presently with lesion on the LLE scheduled for excision, offering c/o exertional dyspnea (possible anginal equivalent) x 2 months.  He was evaluated by his Cardiologist - Dr. Connors and had stress/echo on 2018 revealing inferolateral wall, apical inferior wall and apical anterior wall that do not augment with exercise.  Patient is now for [facility] for which he presents today.      TTE 2018 LVEF 65-70%, Mild MR (07 Dec 2018 11:59)    CT Surgery consulted for CABG eval    Past Medical History  Basal cell carcinoma (BCC) of skin of left lower extremity including hip  Benign prostatic hyperplasia, unspecified whether lower urinary tract symptoms present  Hyperlipidemia, unspecified hyperlipidemia type  Essential hypertension      Past Surgical History  No significant past surgical history      MEDICATIONS  (STANDING):  amLODIPine   Tablet 10 milliGRAM(s) Oral daily  aspirin enteric coated 81 milliGRAM(s) Oral daily  atorvastatin 20 milliGRAM(s) Oral at bedtime  doxazosin 4 milliGRAM(s) Oral at bedtime  metoprolol succinate  milliGRAM(s) Oral daily  sodium chloride 0.9% Bolus 250 milliLiter(s) IV Bolus once    MEDICATIONS  (PRN):      Vital Signs Last 24 Hrs  T(C): 36.4 (18 @ 11:59), Max: 36.4 (18 @ 11:59)  T(F): 97.5 (18 @ 11:59), Max: 97.5 (18 @ 11:59)  HR: 59 (18 @ 11:59) (59 - 59)  BP: 163/96 (18 @ 11:59) (163/96 - 163/96)  RR: 16 (18 @ 11:59) (16 - 16)  SpO2: 100% (18 @ 11:59) (100% - 100%)           Daily Height in cm: 167.64 (07 Dec 2018 11:59)    Daily Weight in k.2 (07 Dec 2018 11:59)  Admit Wt: Drug Dosing Weight  Height (cm): 167.64 (07 Dec 2018 12:29)  Weight (kg): 81.2 (07 Dec 2018 12:29)  BMI (kg/m2): 28.9 (07 Dec 2018 12:29)  BSA (m2): 1.91 (07 Dec 2018 12:29)    Allergies: No Known Allergies      SOCIAL HISTORY:  Smoker: [ ] Yes  [x ] No        PACK YEARS:                         WHEN QUIT?  ETOH use: [x ] Yes  [ ] No              FREQUENCY / QUANTITY: 1 glass wine occasionally   Ilicit Drug use:  [ ] Yes  [x ] No      Relevant Family History  FAMILY HISTORY:  No pertinent family history in first degree relatives      Review of Systems  GENERAL:  no weakness, fatigue, fevers or chills  NEURO: no dizziness, numbness, tingling or weakness  SKIN: no itching, burning, rashes, LLE BCC lesion  HEENT: no visual changes;  no headache, no vertigo, no recent colds  RESPIRATORY: endorses shortness of breath on exertion, no cough, sputum, wheezing, hemoptysis  CARDIOVASCULAR:  no chest pain,  or palpitations  GI: no abd pain. no N/V/D.  PERIPHERAL VASCULAR: no swelling, no tenderness, no erythema, no varicose veins.     PHYSICAL EXAM  General: Well nourished, well developed, NAD.                                              Neuro: Normal exam oriented to person/place & time with no focal motor or sensory  deficits.                    ENT: Normal exam of nasal/oral mucosa with absence of cyanosis.   Neck: Normal exam of jugular veins, trachea & thyroid   Chest: Normal lung exam with good air movement absence of wheezes, rales, or rhonchi         Sternum: Normal                                                               CV:  Auscultation: normal S1S2, no murmurs  Carotids: No Bruits,  Abdominal Aorta: normal                                                                        GI: Normal exam of abdomen with no noted masses or tenderness. Obese +BSx4Q                                                                                            Extremities: Normal no evidence of cyanosis or deformity, Edema: none  Lower Extremity Pulses: BL +2 Varicosities[ - ]  SKIN : intact, LLE BCC wound                                                          LABS:                        11.2   6.4   )-----------( 181      ( 07 Dec 2018 12:16 )             31.8     12-    141  |  104  |  34<H>  ----------------------------<  124<H>  4.7   |  24  |  1.53<H>    Ca    9.0      07 Dec 2018 12:16    TPro  6.9  /  Alb  4.1  /  TBili  0.4  /  DBili  x   /  AST  14  /  ALT  13  /  AlkPhos  90  12          Cardiac Cath: 18  Prelim results:  LM: 60% LM  LAD: 80% ostial LAD  Cx: 80% ostial LCx  RCA: 80% ostial RCA

## 2018-12-07 NOTE — H&P CARDIOLOGY - PMH
Benign prostatic hyperplasia, unspecified whether lower urinary tract symptoms present    Essential hypertension    Hyperlipidemia, unspecified hyperlipidemia type Basal cell carcinoma (BCC) of skin of left lower extremity including hip    Benign prostatic hyperplasia, unspecified whether lower urinary tract symptoms present    Essential hypertension    Hyperlipidemia, unspecified hyperlipidemia type

## 2018-12-08 DIAGNOSIS — N40.0 BENIGN PROSTATIC HYPERPLASIA WITHOUT LOWER URINARY TRACT SYMPTOMS: ICD-10-CM

## 2018-12-08 DIAGNOSIS — E78.5 HYPERLIPIDEMIA, UNSPECIFIED: ICD-10-CM

## 2018-12-08 DIAGNOSIS — I10 ESSENTIAL (PRIMARY) HYPERTENSION: ICD-10-CM

## 2018-12-08 DIAGNOSIS — I25.119 ATHEROSCLEROTIC HEART DISEASE OF NATIVE CORONARY ARTERY WITH UNSPECIFIED ANGINA PECTORIS: ICD-10-CM

## 2018-12-08 LAB
ANION GAP SERPL CALC-SCNC: 11 MMOL/L — SIGNIFICANT CHANGE UP (ref 5–17)
BUN SERPL-MCNC: 29 MG/DL — HIGH (ref 7–23)
CALCIUM SERPL-MCNC: 9 MG/DL — SIGNIFICANT CHANGE UP (ref 8.4–10.5)
CHLORIDE SERPL-SCNC: 106 MMOL/L — SIGNIFICANT CHANGE UP (ref 96–108)
CO2 SERPL-SCNC: 25 MMOL/L — SIGNIFICANT CHANGE UP (ref 22–31)
CREAT SERPL-MCNC: 1.38 MG/DL — HIGH (ref 0.5–1.3)
GLUCOSE SERPL-MCNC: 121 MG/DL — HIGH (ref 70–99)
HCT VFR BLD CALC: 32.8 % — LOW (ref 39–50)
HGB BLD-MCNC: 10.7 G/DL — LOW (ref 13–17)
MAGNESIUM SERPL-MCNC: 1.8 MG/DL — SIGNIFICANT CHANGE UP (ref 1.6–2.6)
MCHC RBC-ENTMCNC: 30.1 PG — SIGNIFICANT CHANGE UP (ref 27–34)
MCHC RBC-ENTMCNC: 32.6 GM/DL — SIGNIFICANT CHANGE UP (ref 32–36)
MCV RBC AUTO: 92.1 FL — SIGNIFICANT CHANGE UP (ref 80–100)
PLATELET # BLD AUTO: 172 K/UL — SIGNIFICANT CHANGE UP (ref 150–400)
POTASSIUM SERPL-MCNC: 4.8 MMOL/L — SIGNIFICANT CHANGE UP (ref 3.5–5.3)
POTASSIUM SERPL-SCNC: 4.8 MMOL/L — SIGNIFICANT CHANGE UP (ref 3.5–5.3)
RBC # BLD: 3.56 M/UL — LOW (ref 4.2–5.8)
RBC # FLD: 14.1 % — SIGNIFICANT CHANGE UP (ref 10.3–14.5)
SODIUM SERPL-SCNC: 142 MMOL/L — SIGNIFICANT CHANGE UP (ref 135–145)
WBC # BLD: 6.49 K/UL — SIGNIFICANT CHANGE UP (ref 3.8–10.5)
WBC # FLD AUTO: 6.49 K/UL — SIGNIFICANT CHANGE UP (ref 3.8–10.5)

## 2018-12-08 PROCEDURE — 99233 SBSQ HOSP IP/OBS HIGH 50: CPT

## 2018-12-08 PROCEDURE — 99232 SBSQ HOSP IP/OBS MODERATE 35: CPT

## 2018-12-08 PROCEDURE — 93010 ELECTROCARDIOGRAM REPORT: CPT

## 2018-12-08 RX ORDER — ATORVASTATIN CALCIUM 80 MG/1
40 TABLET, FILM COATED ORAL AT BEDTIME
Qty: 0 | Refills: 0 | Status: DISCONTINUED | OUTPATIENT
Start: 2018-12-08 | End: 2018-12-11

## 2018-12-08 RX ADMIN — AMLODIPINE BESYLATE 10 MILLIGRAM(S): 2.5 TABLET ORAL at 05:24

## 2018-12-08 RX ADMIN — ATORVASTATIN CALCIUM 40 MILLIGRAM(S): 80 TABLET, FILM COATED ORAL at 21:36

## 2018-12-08 RX ADMIN — Medication 100 MILLIGRAM(S): at 05:24

## 2018-12-08 RX ADMIN — Medication 81 MILLIGRAM(S): at 11:34

## 2018-12-08 RX ADMIN — Medication 4 MILLIGRAM(S): at 21:34

## 2018-12-08 NOTE — CONSULT NOTE ADULT - ATTENDING COMMENTS
Patient interviewed and examined. I was physically present for the essential portions of the E/M service provided.  Chart reviewed and note edited where appropriate.  Case discussed with fellow.  Agree w/ Assessment and Plan as outlined. HRs only transiently dec below 50(w/o sxs) and would continue current dose of BB and continue to monitor.    Piero Castaneda MD [facility]  Spectra:  [id number]  Office: [phone]

## 2018-12-08 NOTE — CONSULT NOTE ADULT - SUBJECTIVE AND OBJECTIVE BOX
Patient seen and evaluated at bedside    HPI:  83 y/o  male, former cigarette smoker - remote past < 5PY with PMHx of HTN, HLD, BPH and Basal Cell Carcinoma of the skin presently with lesion on the LLE scheduled for excision, c/o exertional dyspnea (possible anginal equivalent) x 2 months.  He was evaluated by his Cardiologist - Dr. Connors and had stress/echo on 11/30/2018 revealing inferolateral wall, apical inferior wall and apical anterior wall ischemia.  Patient presents for LHC.  LHC revealing 3VD , patient is planned for IVUS of one of the vessels on Monday.       TTE 9/2018 LVEF 65-70%, Mild MR (07 Dec 2018 11:59)      PMH:   Basal cell carcinoma (BCC) of skin of left lower extremity including hip  Benign prostatic hyperplasia, unspecified whether lower urinary tract symptoms present  Hyperlipidemia, unspecified hyperlipidemia type  Essential hypertension      PSH:   No significant past surgical history      Medications:   amLODIPine   Tablet 10 milliGRAM(s) Oral daily  aspirin enteric coated 81 milliGRAM(s) Oral daily  atorvastatin 20 milliGRAM(s) Oral at bedtime  doxazosin 4 milliGRAM(s) Oral at bedtime  metoprolol succinate  milliGRAM(s) Oral daily  sodium chloride 0.9% Bolus 250 milliLiter(s) IV Bolus once      Allergies:  No Known Allergies      FAMILY HISTORY:  No pertinent family history in first degree relatives      Social History:  Smoking History:  Alcohol Use:  Drug Use:    Review of Systems:  REVIEW OF SYSTEMS:  CONSTITUTIONAL: No weakness, fevers or chills  EYES/ENT: No visual changes;  No dysphagia  NECK: No pain or stiffness  RESPIRATORY: No cough, wheezing, hemoptysis; No shortness of breath  CARDIOVASCULAR: No chest pain or palpitations; No lower extremity edema  GASTROINTESTINAL: No abdominal or epigastric pain. No nausea, vomiting, or hematemesis; No diarrhea or constipation. No melena or hematochezia.  BACK: No back pain  GENITOURINARY: No dysuria, frequency or hematuria  NEUROLOGICAL: No numbness or weakness  SKIN: No itching, burning, rashes, or lesions   All other review of systems is negative unless indicated above.    Physical Exam:  T(F): 98.2 (12-08), Max: 98.2 (12-08)  HR: 60 (12-08) (56 - 63)  BP: 143/66 (12-08) (143/66 - 185/73)  RR: 18 (12-08)  SpO2: 99% (12-08)  GENERAL: No acute distress, well-developed  HEAD:  Atraumatic, Normocephalic  ENT: EOMI, PERRLA, conjunctiva and sclera clear, Neck supple, No JVD, moist mucosa  CHEST/LUNG: Clear to auscultation bilaterally; No wheeze, equal breath sounds bilaterally   BACK: No spinal tenderness  HEART: Regular rate and rhythm; No murmurs, rubs, or gallops  ABDOMEN: Soft, Nontender, Nondistended; Bowel sounds present  EXTREMITIES:  No clubbing, cyanosis, or edema  PSYCH: Nl behavior, nl affect  NEUROLOGY: AAOx3, non-focal, cranial nerves intact  SKIN: Normal color, No rashes or lesions  LINES:    Cardiovascular Diagnostic Testing:    ECG: Personally reviewed  sinus bradycardia     Echo:  none here     Stress Testing:    Cath:      Interpretation of Telemetry:    Imaging:    Labs: Personally reviewed                        11.2   6.4   )-----------( 181      ( 07 Dec 2018 12:16 )             31.8     12-08    142  |  106  |  29<H>  ----------------------------<  121<H>  4.8   |  25  |  1.38<H>    Ca    9.0      08 Dec 2018 06:41  Mg     1.8     12-08    TPro  6.9  /  Alb  4.1  /  TBili  0.4  /  DBili  x   /  AST  14  /  ALT  13  /  AlkPhos  90  12-07

## 2018-12-08 NOTE — CONSULT NOTE ADULT - ASSESSMENT
A/P:  85 y/o  male, former cigarette smoker - remote past < 5PY with PMHx of HTN, HLD, BPH and Basal Cell Carcinoma of the skin presently with lesion on the LLE scheduled for excision, c/o exertional dyspnea , presenting with positive NST. Patient underwent LHC with evidence of possible LM disease and with LAD and LCx vessel disease. Patient is planned for IVUS of left main artery on Monday.     Plan:  #CAD  - continue ASA 81 mg   - Toprol  mg daily   - increase Atorvastatin to 40 mg daily   - plan for IVUS of LM on Monday.     Vince Ceja MD

## 2018-12-08 NOTE — PROGRESS NOTE ADULT - ASSESSMENT
84 year old  male ex smoker (quit a few years ago) with HTN, HLD, BPH and basal cell carcinoma (left lower extremity) presented for an elective left heart cardiac catheterization. 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 To have IVUS Monday and to be seen by CTS, to determine if patient needs CABG or amenable to stenting.

## 2018-12-08 NOTE — PROGRESS NOTE ADULT - PROBLEM SELECTOR PLAN 1
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 To have IVUS Monday and to be seen by CTS, to determine if patient needs CABG or amenable to stenting.   cont with ASA/ Metoprolol/ Atorvastatin Now s/p cardiac catheterization revealing triple vessel disease. To have IVUS Monday and has seen by CTS, to determine if patient needs CABG or amenable to stenting.   cont with ASA/ Metoprolol/ Atorvastatin

## 2018-12-08 NOTE — PROGRESS NOTE ADULT - SUBJECTIVE AND OBJECTIVE BOX
Patient is a 84y old  Male who presents with a chief complaint of chest pain (08 Dec 2018 08:27)      INTERVAL HPI/OVERNIGHT EVENTS:  Patient is an 84 year old  male ex smoker (quit a few years ago) with HTN, HLD, BPH and basal cell carcinoma (left lower extremity) presented for an elective left heart cardiac catheterization. Patient with dyspnea on exertion for the last two months. He was seen by his cardiology Dr. Connors and had a stress echocardiogram on 11/30 that revealed inferolateral wall, apical inferior wall and apical anterior wall that do not augment with exercise. Echocardiogram from 9/2018 shows EF 70% with mild mitral regurgitation.  Patient is s/p  cardiac catheterization revealing triple vessel disease. To have IVUS Monday and to be seen by CTS, to determine if patient needs CABG or amenable to stenting.         Medications:MEDICATIONS  (STANDING):  amLODIPine   Tablet 10 milliGRAM(s) Oral daily  aspirin enteric coated 81 milliGRAM(s) Oral daily  atorvastatin 20 milliGRAM(s) Oral at bedtime  doxazosin 4 milliGRAM(s) Oral at bedtime  metoprolol succinate  milliGRAM(s) Oral daily  sodium chloride 0.9% Bolus 250 milliLiter(s) IV Bolus once    MEDICATIONS  (PRN):      Allergies: Allergies    No Known Allergies    Intolerances        REVIEW OF SYSTEMS:  CONSTITUTIONAL: No fever, weight loss, or fatigue  EYES: No eye pain, visual disturbances, or discharge  ENMT:  No difficulty hearing, tinnitus, vertigo; No sinus or throat pain  NECK: No pain or stiffness  BREASTS: No pain, masses, or nipple discharge  RESPIRATORY: No cough, wheezing, chills or hemoptysis; No shortness of breath  CARDIOVASCULAR: No chest pain, palpitations, dizziness, or leg swelling  GASTROINTESTINAL: No abdominal or epigastric pain. No nausea, vomiting, or hematemesis; No diarrhea or constipation. No melena or hematochezia.  GENITOURINARY: No dysuria, frequency, hematuria, or incontinence  NEUROLOGICAL: No headaches, memory loss, loss of strength, numbness, or tremors  SKIN: No itching, burning, rashes, or lesions   LYMPH NODES: No enlarged glands  ENDOCRINE: No heat or cold intolerance; No hair loss  MUSCULOSKELETAL: No joint pain or swelling; No muscle, back, or extremity pain  PSYCHIATRIC: No depression, anxiety, mood swings, or difficulty sleeping  HEME/LYMPH: No easy bruising, or bleeding gums  ALLERY AND IMMUNOLOGIC: No hives or eczema    T(C): 36.4 (12-08-18 @ 14:13), Max: 36.8 (12-08-18 @ 04:56)  HR: 57 (12-08-18 @ 14:13) (56 - 63)  BP: 145/72 (12-08-18 @ 14:13) (143/66 - 185/73)  RR: 17 (12-08-18 @ 14:13) (17 - 22)  SpO2: 100% (12-08-18 @ 14:13) (98% - 100%)  Wt(kg): --Vital Signs Last 24 Hrs  T(C): 36.4 (08 Dec 2018 14:13), Max: 36.8 (08 Dec 2018 04:56)  T(F): 97.5 (08 Dec 2018 14:13), Max: 98.2 (08 Dec 2018 04:56)  HR: 57 (08 Dec 2018 14:13) (56 - 63)  BP: 145/72 (08 Dec 2018 14:13) (143/66 - 185/73)  BP(mean): --  RR: 17 (08 Dec 2018 14:13) (17 - 22)  SpO2: 100% (08 Dec 2018 14:13) (98% - 100%)  I&O's Summary    07 Dec 2018 07:01  -  08 Dec 2018 07:00  --------------------------------------------------------  IN: 0 mL / OUT: 350 mL / NET: -350 mL    08 Dec 2018 07:01  -  08 Dec 2018 14:54  --------------------------------------------------------  IN: 600 mL / OUT: 200 mL / NET: 400 mL      Last Menstrual Period      PHYSICAL EXAM:  GENERAL: NAD, well-groomed, well-developed  HEAD:  Atraumatic, Normocephalic  EYES: EOMI, PERRLA, conjunctiva and sclera clear  ENMT: No tonsillar erythema, exudates, or enlargement; Moist mucous membranes, Good dentition, No lesions  NECK: Supple, No JVD, Normal thyroid  NERVOUS SYSTEM:  Alert & Oriented X3, Good concentration; Motor Strength 5/5 B/L upper and lower extremities; DTRs 2+ intact and symmetric  CHEST/LUNG: Clear to percussion bilaterally; No rales, rhonchi, wheezing, or rubs  HEART: Regular rate and rhythm; No murmurs, rubs, or gallops  ABDOMEN: Soft, Nontender, Nondistended; Bowel sounds present  EXTREMITIES:  2+ Peripheral Pulses, No clubbing, cyanosis, or edema  LYMPH: No lymphadenopathy noted  SKIN: No rashes or lesions    Consultant(s) Notes Reviewed:  [x ] YES  [ ] NO  Care Discussed with Consultants/Other Providers [ x] YES  [ ] NO  Name of Consultant  LABS:                        10.7   6.49  )-----------( 172      ( 08 Dec 2018 08:07 )             32.8     12-08    142  |  106  |  29<H>  ----------------------------<  121<H>  4.8   |  25  |  1.38<H>    Ca    9.0      08 Dec 2018 06:41  Mg     1.8     12-08    TPro  6.9  /  Alb  4.1  /  TBili  0.4  /  DBili  x   /  AST  14  /  ALT  13  /  AlkPhos  90  12-07        CAPILLARY BLOOD GLUCOSE                RADIOLOGY & ADDITIONAL TESTS:  EKG :     Imaging Personally Reviewed:  [ ] YES  [ ] NO  HEALTH ISSUES - PROBLEM Dx: Patient is a 84y old  Male who presents with a chief complaint of chest pain (08 Dec 2018 08:27)      INTERVAL HPI/OVERNIGHT EVENTS:  Patient is an 84 year old  male ex smoker (quit a few years ago) with HTN, HLD, BPH and basal cell carcinoma (left lower extremity) presented for an elective left heart cardiac catheterization. Patient with dyspnea on exertion for the last two months. He was seen by his cardiology Dr. oCnnors and had a stress echocardiogram on 11/30 that revealed inferolateral wall, apical inferior wall and apical anterior wall that do not augment with exercise. Echocardiogram from 9/2018 shows EF 70% with mild mitral regurgitation.  Patient is s/p  cardiac catheterization revealing triple vessel disease. To have IVUS Monday and to be seen by CTS, to determine if patient needs CABG or amenable to stenting.  No complaints today.          Medications:MEDICATIONS  (STANDING):  amLODIPine   Tablet 10 milliGRAM(s) Oral daily  aspirin enteric coated 81 milliGRAM(s) Oral daily  atorvastatin 20 milliGRAM(s) Oral at bedtime  doxazosin 4 milliGRAM(s) Oral at bedtime  metoprolol succinate  milliGRAM(s) Oral daily  sodium chloride 0.9% Bolus 250 milliLiter(s) IV Bolus once    MEDICATIONS  (PRN):      Allergies: Allergies    No Known Allergies        REVIEW OF SYSTEMS:  CONSTITUTIONAL: No fever  NECK: No pain or stiffness  RESPIRATORY: No cough, wheezing, chills or hemoptysis; No shortness of breath  CARDIOVASCULAR: No chest pain, palpitations, dizziness, or leg swelling  GASTROINTESTINAL: No abdominal or epigastric pain. No nausea, vomiting, or hematemesis; No diarrhea or constipation  NEUROLOGICAL: No headaches  LYMPH NODES: No enlarged glands  PSYCHIATRIC: No depression    T(C): 36.4 (12-08-18 @ 14:13), Max: 36.8 (12-08-18 @ 04:56)  HR: 57 (12-08-18 @ 14:13) (56 - 63)  BP: 145/72 (12-08-18 @ 14:13) (143/66 - 185/73)  RR: 17 (12-08-18 @ 14:13) (17 - 22)  SpO2: 100% (12-08-18 @ 14:13) (98% - 100%)  Wt(kg): --Vital Signs Last 24 Hrs  T(C): 36.4 (08 Dec 2018 14:13), Max: 36.8 (08 Dec 2018 04:56)  T(F): 97.5 (08 Dec 2018 14:13), Max: 98.2 (08 Dec 2018 04:56)  HR: 57 (08 Dec 2018 14:13) (56 - 63)  BP: 145/72 (08 Dec 2018 14:13) (143/66 - 185/73)  BP(mean): --  RR: 17 (08 Dec 2018 14:13) (17 - 22)  SpO2: 100% (08 Dec 2018 14:13) (98% - 100%)  I&O's Summary    07 Dec 2018 07:01  -  08 Dec 2018 07:00  --------------------------------------------------------  IN: 0 mL / OUT: 350 mL / NET: -350 mL    08 Dec 2018 07:01  -  08 Dec 2018 14:54  --------------------------------------------------------  IN: 600 mL / OUT: 200 mL / NET: 400 mL        PHYSICAL EXAM:  GENERAL: NAD, well-groomed, well-developed  NECK: Supple, No JVD, Normal thyroid  NERVOUS SYSTEM:  Alert & Oriented X3, Good concentration  CHEST/LUNG: Clear to percussion bilaterally; No rales, rhonchi, wheezing, or rubs  HEART: Regular rate and rhythm  ABDOMEN: Soft, Nontender, Nondistended; Bowel sounds present  EXTREMITIES:  2+ Peripheral Pulses, No clubbing, cyanosis, or edema  LYMPH: No lymphadenopathy noted    Consultant(s) Notes Reviewed:  [x ] YES  [ ] NO  Care Discussed with Consultants/Other Providers [ x] YES  [ ] NO  Name of Consultant  LABS:                        10.7   6.49  )-----------( 172      ( 08 Dec 2018 08:07 )             32.8     12-08    142  |  106  |  29<H>  ----------------------------<  121<H>  4.8   |  25  |  1.38<H>    Ca    9.0      08 Dec 2018 06:41  Mg     1.8     12-08    TPro  6.9  /  Alb  4.1  /  TBili  0.4  /  DBili  x   /  AST  14  /  ALT  13  /  AlkPhos  90  12-07        CAPILLARY BLOOD GLUCOSE                RADIOLOGY & ADDITIONAL TESTS:  EKG :     Imaging Personally Reviewed:  [ ] YES  [ ] NO  HEALTH ISSUES - PROBLEM Dx:

## 2018-12-09 LAB
ANION GAP SERPL CALC-SCNC: 13 MMOL/L — SIGNIFICANT CHANGE UP (ref 5–17)
BUN SERPL-MCNC: 31 MG/DL — HIGH (ref 7–23)
CALCIUM SERPL-MCNC: 8.6 MG/DL — SIGNIFICANT CHANGE UP (ref 8.4–10.5)
CHLORIDE SERPL-SCNC: 104 MMOL/L — SIGNIFICANT CHANGE UP (ref 96–108)
CO2 SERPL-SCNC: 23 MMOL/L — SIGNIFICANT CHANGE UP (ref 22–31)
CREAT SERPL-MCNC: 1.42 MG/DL — HIGH (ref 0.5–1.3)
GLUCOSE SERPL-MCNC: 115 MG/DL — HIGH (ref 70–99)
HCT VFR BLD CALC: 30 % — LOW (ref 39–50)
HGB BLD-MCNC: 9.8 G/DL — LOW (ref 13–17)
MCHC RBC-ENTMCNC: 30.1 PG — SIGNIFICANT CHANGE UP (ref 27–34)
MCHC RBC-ENTMCNC: 32.7 GM/DL — SIGNIFICANT CHANGE UP (ref 32–36)
MCV RBC AUTO: 92 FL — SIGNIFICANT CHANGE UP (ref 80–100)
PLATELET # BLD AUTO: 167 K/UL — SIGNIFICANT CHANGE UP (ref 150–400)
POTASSIUM SERPL-MCNC: 4.9 MMOL/L — SIGNIFICANT CHANGE UP (ref 3.5–5.3)
POTASSIUM SERPL-SCNC: 4.9 MMOL/L — SIGNIFICANT CHANGE UP (ref 3.5–5.3)
RBC # BLD: 3.26 M/UL — LOW (ref 4.2–5.8)
RBC # FLD: 13.7 % — SIGNIFICANT CHANGE UP (ref 10.3–14.5)
SODIUM SERPL-SCNC: 140 MMOL/L — SIGNIFICANT CHANGE UP (ref 135–145)
WBC # BLD: 6.85 K/UL — SIGNIFICANT CHANGE UP (ref 3.8–10.5)
WBC # FLD AUTO: 6.85 K/UL — SIGNIFICANT CHANGE UP (ref 3.8–10.5)

## 2018-12-09 PROCEDURE — 99233 SBSQ HOSP IP/OBS HIGH 50: CPT

## 2018-12-09 RX ADMIN — Medication 100 MILLIGRAM(S): at 05:12

## 2018-12-09 RX ADMIN — ATORVASTATIN CALCIUM 40 MILLIGRAM(S): 80 TABLET, FILM COATED ORAL at 21:09

## 2018-12-09 RX ADMIN — Medication 4 MILLIGRAM(S): at 21:09

## 2018-12-09 RX ADMIN — AMLODIPINE BESYLATE 10 MILLIGRAM(S): 2.5 TABLET ORAL at 05:12

## 2018-12-09 RX ADMIN — Medication 81 MILLIGRAM(S): at 11:15

## 2018-12-09 NOTE — PROGRESS NOTE ADULT - PROBLEM SELECTOR PLAN 1
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 To have IVUS Monday to determine if patient needs CABG or amenable to stenting.   Pt has seen by CTS and is waiting on IVUS   cont with ASA/ Metoprolol/ Atorvastatin  Pt had episode of PAT with no symptoms/ will cont Telemetry monitoring and BB

## 2018-12-09 NOTE — PROGRESS NOTE ADULT - SUBJECTIVE AND OBJECTIVE BOX
Patient is a 84y old  Male who presents with a chief complaint of chest pain (08 Dec 2018 08:27)      INTERVAL HPI/OVERNIGHT EVENTS:    Medications:MEDICATIONS  (STANDING):  amLODIPine   Tablet 10 milliGRAM(s) Oral daily  aspirin enteric coated 81 milliGRAM(s) Oral daily  atorvastatin 40 milliGRAM(s) Oral at bedtime  doxazosin 4 milliGRAM(s) Oral at bedtime  metoprolol succinate  milliGRAM(s) Oral daily  sodium chloride 0.9% Bolus 250 milliLiter(s) IV Bolus once    MEDICATIONS  (PRN):      Allergies: Allergies    No Known Allergies          REVIEW OF SYSTEMS:      T(C): 36.6 (12-09-18 @ 04:52), Max: 36.6 (12-09-18 @ 04:52)  HR: 61 (12-09-18 @ 04:52) (53 - 61)  BP: 159/75 (12-09-18 @ 04:52) (145/72 - 159/75)  RR: 17 (12-09-18 @ 04:52) (17 - 18)  SpO2: 98% (12-09-18 @ 04:52) (98% - 100%)  Wt(kg): --Vital Signs Last 24 Hrs  T(C): 36.6 (09 Dec 2018 04:52), Max: 36.6 (09 Dec 2018 04:52)  T(F): 97.8 (09 Dec 2018 04:52), Max: 97.8 (09 Dec 2018 04:52)  HR: 61 (09 Dec 2018 04:52) (53 - 61)  BP: 159/75 (09 Dec 2018 04:52) (145/72 - 159/75)  BP(mean): --  RR: 17 (09 Dec 2018 04:52) (17 - 18)  SpO2: 98% (09 Dec 2018 04:52) (98% - 100%)  I&O's Summary    08 Dec 2018 07:01  -  09 Dec 2018 07:00  --------------------------------------------------------  IN: 720 mL / OUT: 200 mL / NET: 520 mL    09 Dec 2018 07:01  -  09 Dec 2018 12:04  --------------------------------------------------------  IN: 360 mL / OUT: 0 mL / NET: 360 mL          PHYSICAL EXAM:      Consultant(s) Notes Reviewed:  [x ] YES  [ ] NO  Care Discussed with Consultants/Other Providers [ x] YES  [ ] NO  Name of Consultant  LABS:                        9.8    6.85  )-----------( 167      ( 09 Dec 2018 08:07 )             30.0     12-09    140  |  104  |  31<H>  ----------------------------<  115<H>  4.9   |  23  |  1.42<H>    Ca    8.6      09 Dec 2018 05:29  Mg     1.8     12-08    TPro  6.9  /  Alb  4.1  /  TBili  0.4  /  DBili  x   /  AST  14  /  ALT  13  /  AlkPhos  90  12-07        CAPILLARY BLOOD GLUCOSE                RADIOLOGY & ADDITIONAL TESTS:  EKG :     Imaging Personally Reviewed:  [ ] YES  [ ] NO  HEALTH ISSUES - PROBLEM Dx:  Benign prostatic hyperplasia, unspecified whether lower urinary tract symptoms present: Benign prostatic hyperplasia, unspecified whether lower urinary tract symptoms present  Hyperlipidemia, unspecified hyperlipidemia type: Hyperlipidemia, unspecified hyperlipidemia type  Essential hypertension: Essential hypertension  Coronary artery disease involving native coronary artery of native heart with angina pectoris: Coronary artery disease involving native coronary artery of native heart with angina pectoris Patient is a 84y old  Male who presents with a chief complaint of chest pain (08 Dec 2018 08:27)      INTERVAL HPI/OVERNIGHT EVENTS: NSR with one episode of PAT w/ abbx 2.52 sec HR up to 150s   Patient seen in Am and offers no complaints.  NO chest pain , no SOB.        Medications:MEDICATIONS  (STANDING):  amLODIPine   Tablet 10 milliGRAM(s) Oral daily  aspirin enteric coated 81 milliGRAM(s) Oral daily  atorvastatin 40 milliGRAM(s) Oral at bedtime  doxazosin 4 milliGRAM(s) Oral at bedtime  metoprolol succinate  milliGRAM(s) Oral daily  sodium chloride 0.9% Bolus 250 milliLiter(s) IV Bolus once    MEDICATIONS  (PRN):      Allergies: Allergies    No Known Allergies      REVIEW OF SYSTEMS:  CONSTITUTIONAL: No fever  NECK: No pain or stiffness  RESPIRATORY: No cough, wheezing, chills or hemoptysis; No shortness of breath  CARDIOVASCULAR: No chest pain, palpitations, dizziness, or leg swelling  GASTROINTESTINAL: No abdominal or epigastric pain. No nausea, vomiting, or hematemesis; No diarrhea or constipation  NEUROLOGICAL: No headaches  LYMPH NODES: No enlarged glands  PSYCHIATRIC: No depression    T(C): 36.6 (12-09-18 @ 04:52), Max: 36.6 (12-09-18 @ 04:52)  HR: 61 (12-09-18 @ 04:52) (53 - 61)  BP: 159/75 (12-09-18 @ 04:52) (145/72 - 159/75)  RR: 17 (12-09-18 @ 04:52) (17 - 18)  SpO2: 98% (12-09-18 @ 04:52) (98% - 100%)  Wt(kg): --Vital Signs Last 24 Hrs  T(C): 36.6 (09 Dec 2018 04:52), Max: 36.6 (09 Dec 2018 04:52)  T(F): 97.8 (09 Dec 2018 04:52), Max: 97.8 (09 Dec 2018 04:52)  HR: 61 (09 Dec 2018 04:52) (53 - 61)  BP: 159/75 (09 Dec 2018 04:52) (145/72 - 159/75)  BP(mean): --  RR: 17 (09 Dec 2018 04:52) (17 - 18)  SpO2: 98% (09 Dec 2018 04:52) (98% - 100%)  I&O's Summary    08 Dec 2018 07:01  -  09 Dec 2018 07:00  --------------------------------------------------------  IN: 720 mL / OUT: 200 mL / NET: 520 mL    09 Dec 2018 07:01  -  09 Dec 2018 12:04  --------------------------------------------------------  IN: 360 mL / OUT: 0 mL / NET: 360 mL          PHYSICAL EXAM:   GENERAL: NAD, well-groomed, well-developed  NECK: Supple, No JVD, Normal thyroid  NERVOUS SYSTEM:  Alert & Oriented X3, Good concentration  CHEST/LUNG: Clear to percussion bilaterally; No rales, rhonchi, wheezing, or rubs  HEART: Regular rate and rhythm  ABDOMEN: Soft, Nontender, Nondistended; Bowel sounds present  EXTREMITIES:  2+ Peripheral Pulses, No clubbing, cyanosis, or edema  LYMPH: No lymphadenopathy noted    Consultant(s) Notes Reviewed:  [x ] YES  [ ] NO  Care Discussed with Consultants/Other Providers [ x] YES  [ ] NO  Name of Consultant  LABS:                        9.8    6.85  )-----------( 167      ( 09 Dec 2018 08:07 )             30.0     12-09    140  |  104  |  31<H>  ----------------------------<  115<H>  4.9   |  23  |  1.42<H>    Ca    8.6      09 Dec 2018 05:29  Mg     1.8     12-08    TPro  6.9  /  Alb  4.1  /  TBili  0.4  /  DBili  x   /  AST  14  /  ALT  13  /  AlkPhos  90  12-07        CAPILLARY BLOOD GLUCOSE                RADIOLOGY & ADDITIONAL TESTS:  EKG :     Imaging Personally Reviewed:  [ ] YES  [ ] NO  HEALTH ISSUES - PROBLEM Dx:  Benign prostatic hyperplasia, unspecified whether lower urinary tract symptoms present: Benign prostatic hyperplasia, unspecified whether lower urinary tract symptoms present  Hyperlipidemia, unspecified hyperlipidemia type: Hyperlipidemia, unspecified hyperlipidemia type  Essential hypertension: Essential hypertension  Coronary artery disease involving native coronary artery of native heart with angina pectoris: Coronary artery disease involving native coronary artery of native heart with angina pectoris

## 2018-12-09 NOTE — PROGRESS NOTE ADULT - ASSESSMENT
84 year old  male ex smoker (quit a few years ago) with HTN, HLD, BPH and basal cell carcinoma (left lower extremity) presented for an elective left heart cardiac catheterization. 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 To have IVUS Monday 12/9/18  to determine if patient needs CABG or amenable to stenting.

## 2018-12-10 LAB
ANION GAP SERPL CALC-SCNC: 14 MMOL/L — SIGNIFICANT CHANGE UP (ref 5–17)
APPEARANCE UR: CLEAR — SIGNIFICANT CHANGE UP
APTT BLD: 29.4 SEC — SIGNIFICANT CHANGE UP (ref 27.5–36.3)
BILIRUB UR-MCNC: NEGATIVE — SIGNIFICANT CHANGE UP
BUN SERPL-MCNC: 32 MG/DL — HIGH (ref 7–23)
CALCIUM SERPL-MCNC: 8.4 MG/DL — SIGNIFICANT CHANGE UP (ref 8.4–10.5)
CHLORIDE SERPL-SCNC: 103 MMOL/L — SIGNIFICANT CHANGE UP (ref 96–108)
CO2 SERPL-SCNC: 22 MMOL/L — SIGNIFICANT CHANGE UP (ref 22–31)
COLOR SPEC: SIGNIFICANT CHANGE UP
CREAT SERPL-MCNC: 1.4 MG/DL — HIGH (ref 0.5–1.3)
DIFF PNL FLD: NEGATIVE — SIGNIFICANT CHANGE UP
GLUCOSE SERPL-MCNC: 123 MG/DL — HIGH (ref 70–99)
GLUCOSE UR QL: NEGATIVE — SIGNIFICANT CHANGE UP
HCT VFR BLD CALC: 31.7 % — LOW (ref 39–50)
HGB BLD-MCNC: 10.5 G/DL — LOW (ref 13–17)
INR BLD: 0.97 RATIO — SIGNIFICANT CHANGE UP (ref 0.88–1.16)
KETONES UR-MCNC: NEGATIVE — SIGNIFICANT CHANGE UP
LEUKOCYTE ESTERASE UR-ACNC: NEGATIVE — SIGNIFICANT CHANGE UP
MCHC RBC-ENTMCNC: 30.2 PG — SIGNIFICANT CHANGE UP (ref 27–34)
MCHC RBC-ENTMCNC: 33.1 GM/DL — SIGNIFICANT CHANGE UP (ref 32–36)
MCV RBC AUTO: 91.1 FL — SIGNIFICANT CHANGE UP (ref 80–100)
NITRITE UR-MCNC: NEGATIVE — SIGNIFICANT CHANGE UP
PH UR: 5.5 — SIGNIFICANT CHANGE UP (ref 5–8)
PLATELET # BLD AUTO: 167 K/UL — SIGNIFICANT CHANGE UP (ref 150–400)
POTASSIUM SERPL-MCNC: 4.8 MMOL/L — SIGNIFICANT CHANGE UP (ref 3.5–5.3)
POTASSIUM SERPL-SCNC: 4.8 MMOL/L — SIGNIFICANT CHANGE UP (ref 3.5–5.3)
PROT UR-MCNC: SIGNIFICANT CHANGE UP
PROTHROM AB SERPL-ACNC: 11.1 SEC — SIGNIFICANT CHANGE UP (ref 10–12.9)
RBC # BLD: 3.48 M/UL — LOW (ref 4.2–5.8)
RBC # FLD: 13.8 % — SIGNIFICANT CHANGE UP (ref 10.3–14.5)
SODIUM SERPL-SCNC: 139 MMOL/L — SIGNIFICANT CHANGE UP (ref 135–145)
SP GR SPEC: 1.02 — SIGNIFICANT CHANGE UP (ref 1.01–1.02)
UROBILINOGEN FLD QL: NEGATIVE — SIGNIFICANT CHANGE UP
WBC # BLD: 6.66 K/UL — SIGNIFICANT CHANGE UP (ref 3.8–10.5)
WBC # FLD AUTO: 6.66 K/UL — SIGNIFICANT CHANGE UP (ref 3.8–10.5)

## 2018-12-10 PROCEDURE — 93306 TTE W/DOPPLER COMPLETE: CPT | Mod: 26

## 2018-12-10 PROCEDURE — 93880 EXTRACRANIAL BILAT STUDY: CPT | Mod: 26

## 2018-12-10 PROCEDURE — 99231 SBSQ HOSP IP/OBS SF/LOW 25: CPT

## 2018-12-10 PROCEDURE — 71045 X-RAY EXAM CHEST 1 VIEW: CPT | Mod: 26

## 2018-12-10 RX ORDER — CEFUROXIME AXETIL 250 MG
1500 TABLET ORAL ONCE
Qty: 0 | Refills: 0 | Status: DISCONTINUED | OUTPATIENT
Start: 2018-12-10 | End: 2018-12-11

## 2018-12-10 RX ORDER — CHLORHEXIDINE GLUCONATE 213 G/1000ML
1 SOLUTION TOPICAL ONCE
Qty: 0 | Refills: 0 | Status: COMPLETED | OUTPATIENT
Start: 2018-12-11 | End: 2018-12-11

## 2018-12-10 RX ORDER — CHLORHEXIDINE GLUCONATE 213 G/1000ML
1 SOLUTION TOPICAL ONCE
Qty: 0 | Refills: 0 | Status: COMPLETED | OUTPATIENT
Start: 2018-12-10 | End: 2018-12-10

## 2018-12-10 RX ORDER — SODIUM CHLORIDE 9 MG/ML
250 INJECTION INTRAMUSCULAR; INTRAVENOUS; SUBCUTANEOUS ONCE
Qty: 0 | Refills: 0 | Status: COMPLETED | OUTPATIENT
Start: 2018-12-10 | End: 2018-12-10

## 2018-12-10 RX ORDER — CHLORHEXIDINE GLUCONATE 213 G/1000ML
30 SOLUTION TOPICAL ONCE
Qty: 0 | Refills: 0 | Status: DISCONTINUED | OUTPATIENT
Start: 2018-12-10 | End: 2018-12-11

## 2018-12-10 RX ADMIN — AMLODIPINE BESYLATE 10 MILLIGRAM(S): 2.5 TABLET ORAL at 05:18

## 2018-12-10 RX ADMIN — ATORVASTATIN CALCIUM 40 MILLIGRAM(S): 80 TABLET, FILM COATED ORAL at 23:10

## 2018-12-10 RX ADMIN — Medication 81 MILLIGRAM(S): at 13:16

## 2018-12-10 RX ADMIN — SODIUM CHLORIDE 500 MILLILITER(S): 9 INJECTION INTRAMUSCULAR; INTRAVENOUS; SUBCUTANEOUS at 14:30

## 2018-12-10 RX ADMIN — CHLORHEXIDINE GLUCONATE 1 APPLICATION(S): 213 SOLUTION TOPICAL at 23:11

## 2018-12-10 RX ADMIN — Medication 100 MILLIGRAM(S): at 13:16

## 2018-12-10 RX ADMIN — Medication 4 MILLIGRAM(S): at 23:10

## 2018-12-10 NOTE — PROGRESS NOTE ADULT - SUBJECTIVE AND OBJECTIVE BOX
Cardiology Progress Note    Interval: Pt resting comfortably in bed. Noted no chest pain.    Tele:    HPI:  85 y/o  male, former cigarette smoker - remote past < 5PY with PMHx of HTN, HLD, BPH and Basal Cell Carcinoma of the skin presently with lesion on the LLE scheduled for excision, offering c/o exertional dyspnea (possible anginal equivalent) x 2 months.  He was evaluated by his Cardiologist - Dr. Connors and had stress/echo on 2018 revealing inferolateral wall, apical inferior wall and apical anterior wall that do not augment with exercise.  Patient is now for [facility] for which he presents today.      TTE 2018 LVEF 65-70%, Mild MR (07 Dec 2018 11:59)      Medications:  amLODIPine   Tablet 10 milliGRAM(s) Oral daily  aspirin enteric coated 81 milliGRAM(s) Oral daily  atorvastatin 40 milliGRAM(s) Oral at bedtime  doxazosin 4 milliGRAM(s) Oral at bedtime  metoprolol succinate  milliGRAM(s) Oral daily  sodium chloride 0.9% Bolus 250 milliLiter(s) IV Bolus once      Review of Systems:  Constitutional: [ ] Fever [ ] Chills [ ] Fatigue [ ] Weight change   HEENT: [ ] Blurred vision [ ] Eye Pain [ ] Headache [ ] Runny nose [ ] Sore Throat   Respiratory: [ ] Cough [ ] Wheezing [ ] Shortness of breath  Cardiovascular: [ ] Chest Pain [ ] Palpitations [ ] LIM [ ] PND [ ] Orthopnea  Gastrointestinal: [ ] Abdominal Pain [ ] Diarrhea [ ] Constipation [ ] Hemorrhoids [ ] Nausea [ ] Vomiting  Genitourinary: [ ] Nocturia [ ] Dysuria [ ] Incontinence  Extremities: [ ] Swelling [ ] Joint Pain  Neurologic: [ ] Focal deficit [ ] Paresthesias [ ] Syncope  Lymphatic: [ ] Swelling [ ] Lymphadenopathy   Skin: [ ] Rash [ ] Ecchymoses [ ] Wounds [ ] Lesions  Psychiatry: [ ] Depression [ ] Suicidal/Homicidal Ideation [ ] Anxiety [ ] Sleep Disturbances  [ ] 10 point review of systems is otherwise negative except as mentioned above            [ ]Unable to obtain    Vitals:  T(C): 36.5 (12-10-18 @ 05:15), Max: 36.6 (18 @ 12:46)  HR: 50 (12-10-18 @ 05:15) (50 - 63)  BP: 168/63 (12-10-18 @ 05:15) (158/54 - 168/63)  BP(mean): --  RR: 18 (12-10-18 @ 05:15) (16 - 18)  SpO2: 98% (12-10-18 @ 05:15) (98% - 99%)  Wt(kg): --  Daily     Daily Weight in k.8 (10 Dec 2018 07:06)  I&O's Summary    09 Dec 2018 07:01  -  10 Dec 2018 07:00  --------------------------------------------------------  IN: 960 mL / OUT: 201 mL / NET: 759 mL        Physical Exam:  General: NAD  Eye: PERRL, EOMI  HENT: Normal oral mucosa NC/AT  CV: Normal S1/S2, RRR, No M/R/G, trace pitting edema, elevation in JVP  Resp: Normal respiratory effort, clear to auscultation bilaterally, no wheezing, no crackles  Abd: Soft, Non-tender, Non-distended, BS+, obese  Ext: No clubbing, No joint deformity   Neuro: Non-focal, motor and sensory intact  Lymph: No lymphadenopathy  Psych: AAOx3, Mood & affect appropriate  Skin: No rashes, No ecchymoses, No cyanosis    Labs:                        10.5   6.66  )-----------( 167      ( 10 Dec 2018 07:56 )             31.7     12-10    139  |  103  |  32<H>  ----------------------------<  123<H>  4.8   |  22  |  1.40<H>    Ca    8.4      10 Dec 2018 05:37                    New results/imaging:

## 2018-12-10 NOTE — PROGRESS NOTE ADULT - SUBJECTIVE AND OBJECTIVE BOX
Patient is a 84y old  Male who presents with a chief complaint of chest pain (08 Dec 2018 08:27)    INTERVAL HPI/OVERNIGHT EVENTS: NSR with one episode of PAT w/ abbx 2.52 sec HR up to 150s   Patient seen in Am and offers no complaints.  NO chest pain , no SOB.        Allergies:   No Known Allergies      REVIEW OF SYSTEMS:  CONSTITUTIONAL: No fever  NECK: No pain or stiffness  RESPIRATORY: No cough, wheezing, chills or hemoptysis; No shortness of breath  CARDIOVASCULAR: No chest pain, palpitations, dizziness, or leg swelling  GASTROINTESTINAL: No abdominal or epigastric pain. No nausea, vomiting, or hematemesis; No diarrhea or constipation  NEUROLOGICAL: No headaches  LYMPH NODES: No enlarged glands  PSYCHIATRIC: No depression    PHYSICAL EXAM:  ICU Vital Signs Last 24 Hrs  T(C): 36.5 (10 Dec 2018 05:15), Max: 36.6 (09 Dec 2018 12:46)  T(F): 97.7 (10 Dec 2018 05:15), Max: 97.9 (09 Dec 2018 20:44)  HR: 50 (10 Dec 2018 05:15) (50 - 63)  BP: 168/63 (10 Dec 2018 05:15) (158/54 - 168/63)  RR: 18 (10 Dec 2018 05:15) (16 - 18)  SpO2: 98% (10 Dec 2018 05:15) (98% - 99%)  GENERAL: NAD, well-groomed, well-developed  NECK: Supple, No JVD, Normal thyroid  NERVOUS SYSTEM:  Alert & Oriented X3, Good concentration  CHEST/LUNG: Clear to percussion bilaterally; No rales, rhonchi, wheezing, or rubs  HEART: Regular rate and rhythm  ABDOMEN: Soft, Nontender, Nondistended; Bowel sounds present  EXTREMITIES:  2+ Peripheral Pulses, No clubbing, cyanosis, or edema  LYMPH: No lymphadenopathy noted    LABS:   CBC Full  -  ( 10 Dec 2018 07:56 )  WBC Count : 6.66 K/uL  Hemoglobin : 10.5 g/dL  Hematocrit : 31.7 %  Platelet Count - Automated : 167 K/uL      139  |  103  |  32<H>  ----------------------------<  123<H>  4.8   |  22  |  1.40<H>    Ca    8.4      10 Dec 2018 05:37 Patient is a 84y old  Male who presents with a chief complaint of chest pain (08 Dec 2018 08:27)    INTERVAL HPI/OVERNIGHT EVENTS: NSR with one episode of PAT w/ abbx 2.52 sec HR up to 150s  patient is very comfortable. denies any overnight events. hemodynamically stable. Denies any HA, CP, SOB. I have throughly discussed with patient and patient's daughter (NP) plan of care. Pending IVUS today.     REVIEW OF SYSTEMS:  CONSTITUTIONAL: No fever  NECK: No pain or stiffness  RESPIRATORY: No cough, wheezing, chills or hemoptysis; No shortness of breath  CARDIOVASCULAR: No chest pain, palpitations, dizziness, or leg swelling  GASTROINTESTINAL: No abdominal or epigastric pain. No nausea, vomiting, or hematemesis; No diarrhea or constipation  NEUROLOGICAL: No headaches  LYMPH NODES: No enlarged glands  PSYCHIATRIC: No depression    PHYSICAL EXAM:  ICU Vital Signs Last 24 Hrs  T(C): 36.5 (10 Dec 2018 05:15), Max: 36.6 (09 Dec 2018 12:46)  T(F): 97.7 (10 Dec 2018 05:15), Max: 97.9 (09 Dec 2018 20:44)  HR: 50 (10 Dec 2018 05:15) (50 - 63)  BP: 168/63 (10 Dec 2018 05:15) (158/54 - 168/63)  RR: 18 (10 Dec 2018 05:15) (16 - 18)  SpO2: 98% (10 Dec 2018 05:15) (98% - 99%)  GENERAL: NAD, well-groomed, well-developed  NECK: Supple, No JVD, Normal thyroid  NERVOUS SYSTEM:  Alert & Oriented X3, Good concentration  CHEST/LUNG: Clear to percussion bilaterally; No rales, rhonchi, wheezing, or rubs  HEART: Regular rate and rhythm  ABDOMEN: Soft, Nontender, Nondistended; Bowel sounds present  EXTREMITIES:  2+ Peripheral Pulses, No clubbing, cyanosis, or edema  LYMPH: No lymphadenopathy noted    LABS:   CBC Full  -  ( 10 Dec 2018 07:56 )  WBC Count : 6.66 K/uL  Hemoglobin : 10.5 g/dL  Hematocrit : 31.7 %  Platelet Count - Automated : 167 K/uL      139  |  103  |  32<H>  ----------------------------<  123<H>  4.8   |  22  |  1.40<H>    Ca    8.4      10 Dec 2018 05:37

## 2018-12-10 NOTE — PROGRESS NOTE ADULT - PROBLEM SELECTOR PLAN 1
- Tele monitor   - s/p cardiac catheterization revealing triple vessel disease  - IVUS Monday to determine if patient needs CABG or amenable to stenting  - Pt has seen by CTS and is waiting on IVUS - to further evaluate re-vascularization strategy  - cont with ASA/ BB / STATIN  - Pt had episode of PAT with no symptoms/ will cont Telemetry monitoring and BB - Tele monitor   - s/p cardiac catheterization revealing triple vessel disease  - IVUS Monday to determine if patient needs CABG or amenable to stenting  - Pt has seen by CTS and is waiting on IVUS - to further evaluate re-vascularization strategy  - cont with ASA/ BB / STATIN  - Pt had episode of PAT - non-overnight

## 2018-12-10 NOTE — PROGRESS NOTE ADULT - ATTENDING COMMENTS
Cherie Lopez   Hospitalist   
Cherie Lopez  HOspitalist   
Patient interviewed and examined. I was physically present for the essential portions of the E/M service provided.  Chart reviewed and note edited where appropriate.  Case discussed with fellow.  Agree w/ Assessment and Plan as outlined.    Piero Castaneda MD [facility]  Spectra:  [id number]  Office: [phone]

## 2018-12-10 NOTE — PROGRESS NOTE ADULT - SUBJECTIVE AND OBJECTIVE BOX
Cardiac Surgery Pre-op Note:     Surgeon: Dr. Rob    Procedure: 12/11/18 CABG    HPI:  83 y/o  male, former cigarette smoker - remote past < 5PY with PMHx of HTN, HLD, BPH and Basal Cell Carcinoma of the skin presently with lesion on the LLE scheduled for excision, offering c/o exertional dyspnea (possible anginal equivalent) x 2 months.  He was evaluated by his Cardiologist - Dr. Connors and had stress/echo on 11/30/2018 revealing inferolateral wall, apical inferior wall and apical anterior wall that do not augment with exercise.  Patient is now for [facility] for which he presents today.    TTE 9/2018 LVEF 65-70%, Mild MR (07 Dec 2018 11:59)      PAST MEDICAL & SURGICAL HISTORY:  Basal cell carcinoma (BCC) of skin of left lower extremity including hip  Benign prostatic hyperplasia, unspecified whether lower urinary tract symptoms present  Hyperlipidemia, unspecified hyperlipidemia type  Essential hypertension  No significant past surgical history      MEDICATIONS  (STANDING):  amLODIPine   Tablet 10 milliGRAM(s) Oral daily  aspirin enteric coated 81 milliGRAM(s) Oral daily  atorvastatin 40 milliGRAM(s) Oral at bedtime  cefuroxime  IVPB 1500 milliGRAM(s) IV Intermittent once  chlorhexidine 0.12% Liquid 30 milliLiter(s) Swish and Spit once  chlorhexidine 4% Liquid 1 Application(s) Topical once  doxazosin 4 milliGRAM(s) Oral at bedtime  metoprolol succinate  milliGRAM(s) Oral daily  sodium chloride 0.9% Bolus 250 milliLiter(s) IV Bolus once      Vital Signs Last 24 Hrs  T(C): 36.6 (12-10-18 @ 13:14), Max: 36.6 (12-09-18 @ 20:44)  T(F): 97.9 (12-10-18 @ 13:14), Max: 97.9 (12-09-18 @ 20:44)  HR: 62 (12-10-18 @ 13:14) (50 - 62)  BP: 165/66 (12-10-18 @ 13:14) (158/54 - 168/63)  RR: 17 (12-10-18 @ 13:14) (16 - 18)  SpO2: 97% (12-10-18 @ 13:14) (97% - 99%)             Height (cm): 167.64  Weight (kg): 81.2   BMI (kg/m2): 28.9    (07 Dec 2018 12:29)      Labs:                        10.5   6.66  )-----------( 167      ( 10 Dec 2018 07:56 )             31.7       139  |  103  |  32<H>  ----------------------------<  123<H>  4.8   |  22  |  1.40<H>      Thyroid Panel:   MRSA:  / MSSA:     CXR:     Carotid Duplex:      PFT's:    Echocardiogram:    < from: Cardiac Cath Lab - Adult (12.07.18 @ 15:00) >  CORONARY VESSELS: The coronary circulation is right dominant.  LM:   --  Distal left main: There was a discrete 70 % stenosis. The lesion  was eccentric and heavily calcified.  LAD:   --  Mid LAD: There was a 40 % stenosis.  CX:   --  Ostial circumflex: There was a discrete 85 % stenosis.  RCA:   --  Ostial RCA: There was a discrete 80 % stenosis      PHYSICAL EXAM:  Neuro: A&Ox3, NAD  Pulm: B/L BS CTA  CV: RRR,+S1S2  Abd: Soft, NT/ND +[id number]  Ext: B/L LE no edema, +PP, no calf tenderness

## 2018-12-10 NOTE — PROGRESS NOTE ADULT - ASSESSMENT
A/P: 83 y/o  male, former cigarette smoker - remote past <5PY with PMHx of HTN, HLD, BPH and Basal Cell Carcinoma of the skin presently with lesion on the LLE scheduled for excision, c/o exertional dyspnea , presenting with positive NST.     - LHC showing LM, LCx, and RCA lesions  - IVUS planned for today with Dr. Prasad  - c/w medical management     Moris Vilchis, #[id number]  Cardiology Fellow

## 2018-12-11 ENCOUNTER — APPOINTMENT (OUTPATIENT)
Dept: CARDIOTHORACIC SURGERY | Facility: HOSPITAL | Age: 83
End: 2018-12-11

## 2018-12-11 PROBLEM — C44.719 BASAL CELL CARCINOMA OF SKIN OF LEFT LOWER LIMB, INCLUDING HIP: Chronic | Status: ACTIVE | Noted: 2018-12-07

## 2018-12-11 PROBLEM — N40.0 BENIGN PROSTATIC HYPERPLASIA WITHOUT LOWER URINARY TRACT SYMPTOMS: Chronic | Status: ACTIVE | Noted: 2018-12-07

## 2018-12-11 PROBLEM — I10 ESSENTIAL (PRIMARY) HYPERTENSION: Chronic | Status: ACTIVE | Noted: 2018-12-07

## 2018-12-11 PROBLEM — E78.5 HYPERLIPIDEMIA, UNSPECIFIED: Chronic | Status: ACTIVE | Noted: 2018-12-07

## 2018-12-11 LAB
ALBUMIN SERPL ELPH-MCNC: 3.3 G/DL — SIGNIFICANT CHANGE UP (ref 3.3–5)
ALP SERPL-CCNC: 49 U/L — SIGNIFICANT CHANGE UP (ref 40–120)
ALT FLD-CCNC: 10 U/L — SIGNIFICANT CHANGE UP (ref 10–45)
ANION GAP SERPL CALC-SCNC: 12 MMOL/L — SIGNIFICANT CHANGE UP (ref 5–17)
ANION GAP SERPL CALC-SCNC: 14 MMOL/L — SIGNIFICANT CHANGE UP (ref 5–17)
APTT BLD: 30.2 SEC — SIGNIFICANT CHANGE UP (ref 27.5–36.3)
AST SERPL-CCNC: 24 U/L — SIGNIFICANT CHANGE UP (ref 10–40)
BASE EXCESS BLDV CALC-SCNC: -3.3 MMOL/L — LOW (ref -2–2)
BASOPHILS # BLD AUTO: 0.1 K/UL — SIGNIFICANT CHANGE UP (ref 0–0.2)
BASOPHILS NFR BLD AUTO: 0.6 % — SIGNIFICANT CHANGE UP (ref 0–2)
BILIRUB SERPL-MCNC: 0.7 MG/DL — SIGNIFICANT CHANGE UP (ref 0.2–1.2)
BLD GP AB SCN SERPL QL: NEGATIVE — SIGNIFICANT CHANGE UP
BLD GP AB SCN SERPL QL: NEGATIVE — SIGNIFICANT CHANGE UP
BUN SERPL-MCNC: 25 MG/DL — HIGH (ref 7–23)
BUN SERPL-MCNC: 32 MG/DL — HIGH (ref 7–23)
CA-I SERPL-SCNC: 1.17 MMOL/L — SIGNIFICANT CHANGE UP (ref 1.12–1.3)
CALCIUM SERPL-MCNC: 8.2 MG/DL — LOW (ref 8.4–10.5)
CALCIUM SERPL-MCNC: 8.8 MG/DL — SIGNIFICANT CHANGE UP (ref 8.4–10.5)
CHLORIDE BLDV-SCNC: 109 MMOL/L — HIGH (ref 96–108)
CHLORIDE SERPL-SCNC: 105 MMOL/L — SIGNIFICANT CHANGE UP (ref 96–108)
CHLORIDE SERPL-SCNC: 110 MMOL/L — HIGH (ref 96–108)
CK MB BLD-MCNC: 8.5 % — HIGH (ref 0–3.5)
CK MB CFR SERPL CALC: 26.2 NG/ML — HIGH (ref 0–6.7)
CK SERPL-CCNC: 307 U/L — HIGH (ref 30–200)
CO2 BLDV-SCNC: 24 MMOL/L — SIGNIFICANT CHANGE UP (ref 22–30)
CO2 SERPL-SCNC: 19 MMOL/L — LOW (ref 22–31)
CO2 SERPL-SCNC: 24 MMOL/L — SIGNIFICANT CHANGE UP (ref 22–31)
CREAT SERPL-MCNC: 1.23 MG/DL — SIGNIFICANT CHANGE UP (ref 0.5–1.3)
CREAT SERPL-MCNC: 1.43 MG/DL — HIGH (ref 0.5–1.3)
EOSINOPHIL # BLD AUTO: 0.2 K/UL — SIGNIFICANT CHANGE UP (ref 0–0.5)
EOSINOPHIL NFR BLD AUTO: 1.8 % — SIGNIFICANT CHANGE UP (ref 0–6)
FIBRINOGEN PPP-MCNC: 385 MG/DL — SIGNIFICANT CHANGE UP (ref 350–510)
GAS PNL BLDA: SIGNIFICANT CHANGE UP
GAS PNL BLDV: 141 MMOL/L — SIGNIFICANT CHANGE UP (ref 136–145)
GAS PNL BLDV: SIGNIFICANT CHANGE UP
GAS PNL BLDV: SIGNIFICANT CHANGE UP
GLUCOSE BLDC GLUCOMTR-MCNC: 105 MG/DL — HIGH (ref 70–99)
GLUCOSE BLDC GLUCOMTR-MCNC: 121 MG/DL — HIGH (ref 70–99)
GLUCOSE BLDV-MCNC: 117 MG/DL — HIGH (ref 70–99)
GLUCOSE SERPL-MCNC: 124 MG/DL — HIGH (ref 70–99)
GLUCOSE SERPL-MCNC: 127 MG/DL — HIGH (ref 70–99)
HBA1C BLD-MCNC: 5.6 % — SIGNIFICANT CHANGE UP (ref 4–5.6)
HCO3 BLDV-SCNC: 22 MMOL/L — SIGNIFICANT CHANGE UP (ref 21–29)
HCT VFR BLD CALC: 27.3 % — LOW (ref 39–50)
HCT VFR BLD CALC: 29.4 % — LOW (ref 39–50)
HCT VFR BLDA CALC: 28 % — LOW (ref 39–50)
HGB BLD CALC-MCNC: 9.2 G/DL — LOW (ref 13–17)
HGB BLD-MCNC: 10.5 G/DL — LOW (ref 13–17)
HGB BLD-MCNC: 9.6 G/DL — LOW (ref 13–17)
HOROWITZ INDEX BLDV+IHG-RTO: 100 — SIGNIFICANT CHANGE UP
INR BLD: 1.25 RATIO — HIGH (ref 0.88–1.16)
LACTATE BLDV-MCNC: 1.2 MMOL/L — SIGNIFICANT CHANGE UP (ref 0.7–2)
LYMPHOCYTES # BLD AUTO: 1 K/UL — SIGNIFICANT CHANGE UP (ref 1–3.3)
LYMPHOCYTES # BLD AUTO: 10.2 % — LOW (ref 13–44)
MAGNESIUM SERPL-MCNC: 2.6 MG/DL — SIGNIFICANT CHANGE UP (ref 1.6–2.6)
MCHC RBC-ENTMCNC: 30.2 PG — SIGNIFICANT CHANGE UP (ref 27–34)
MCHC RBC-ENTMCNC: 31.6 PG — SIGNIFICANT CHANGE UP (ref 27–34)
MCHC RBC-ENTMCNC: 35.4 GM/DL — SIGNIFICANT CHANGE UP (ref 32–36)
MCHC RBC-ENTMCNC: 35.7 GM/DL — SIGNIFICANT CHANGE UP (ref 32–36)
MCV RBC AUTO: 85.5 FL — SIGNIFICANT CHANGE UP (ref 80–100)
MCV RBC AUTO: 88.4 FL — SIGNIFICANT CHANGE UP (ref 80–100)
MONOCYTES # BLD AUTO: 0.7 K/UL — SIGNIFICANT CHANGE UP (ref 0–0.9)
MONOCYTES NFR BLD AUTO: 7.5 % — SIGNIFICANT CHANGE UP (ref 2–14)
MRSA PCR RESULT.: SIGNIFICANT CHANGE UP
NEUTROPHILS # BLD AUTO: 7.6 K/UL — HIGH (ref 1.8–7.4)
NEUTROPHILS NFR BLD AUTO: 79.9 % — HIGH (ref 43–77)
NT-PROBNP SERPL-SCNC: 802 PG/ML — HIGH (ref 0–300)
OTHER CELLS CSF MANUAL: 10 ML/DL — LOW (ref 18–22)
PCO2 BLDV: 45 MMHG — SIGNIFICANT CHANGE UP (ref 35–50)
PH BLDV: 7.31 — LOW (ref 7.35–7.45)
PHOSPHATE SERPL-MCNC: 2.8 MG/DL — SIGNIFICANT CHANGE UP (ref 2.5–4.5)
PLATELET # BLD AUTO: 115 K/UL — LOW (ref 150–400)
PLATELET # BLD AUTO: 159 K/UL — SIGNIFICANT CHANGE UP (ref 150–400)
PO2 BLDV: 46 MMHG — HIGH (ref 25–45)
POTASSIUM BLDV-SCNC: 4.4 MMOL/L — SIGNIFICANT CHANGE UP (ref 3.5–5.3)
POTASSIUM SERPL-MCNC: 4.8 MMOL/L — SIGNIFICANT CHANGE UP (ref 3.5–5.3)
POTASSIUM SERPL-MCNC: 5.2 MMOL/L — SIGNIFICANT CHANGE UP (ref 3.5–5.3)
POTASSIUM SERPL-MCNC: 5.7 MMOL/L — HIGH (ref 3.5–5.3)
POTASSIUM SERPL-SCNC: 4.8 MMOL/L — SIGNIFICANT CHANGE UP (ref 3.5–5.3)
POTASSIUM SERPL-SCNC: 5.2 MMOL/L — SIGNIFICANT CHANGE UP (ref 3.5–5.3)
POTASSIUM SERPL-SCNC: 5.7 MMOL/L — HIGH (ref 3.5–5.3)
PROT SERPL-MCNC: 4.7 G/DL — LOW (ref 6–8.3)
PROTHROM AB SERPL-ACNC: 14.3 SEC — HIGH (ref 10–12.9)
RBC # BLD: 3.19 M/UL — LOW (ref 4.2–5.8)
RBC # BLD: 3.33 M/UL — LOW (ref 4.2–5.8)
RBC # FLD: 12.5 % — SIGNIFICANT CHANGE UP (ref 10.3–14.5)
RBC # FLD: 12.7 % — SIGNIFICANT CHANGE UP (ref 10.3–14.5)
RH IG SCN BLD-IMP: POSITIVE — SIGNIFICANT CHANGE UP
RH IG SCN BLD-IMP: POSITIVE — SIGNIFICANT CHANGE UP
S AUREUS DNA NOSE QL NAA+PROBE: SIGNIFICANT CHANGE UP
SAO2 % BLDV: 78 % — SIGNIFICANT CHANGE UP (ref 67–88)
SODIUM SERPL-SCNC: 141 MMOL/L — SIGNIFICANT CHANGE UP (ref 135–145)
SODIUM SERPL-SCNC: 143 MMOL/L — SIGNIFICANT CHANGE UP (ref 135–145)
T3 SERPL-MCNC: 82 NG/DL — SIGNIFICANT CHANGE UP (ref 80–200)
T4 AB SER-ACNC: 7.4 UG/DL — SIGNIFICANT CHANGE UP (ref 4.6–12)
TROPONIN T, HIGH SENSITIVITY RESULT: 370 NG/L — HIGH (ref 0–51)
TSH SERPL-MCNC: 5.5 UIU/ML — HIGH (ref 0.27–4.2)
WBC # BLD: 7 K/UL — SIGNIFICANT CHANGE UP (ref 3.8–10.5)
WBC # BLD: 9.6 K/UL — SIGNIFICANT CHANGE UP (ref 3.8–10.5)
WBC # FLD AUTO: 7 K/UL — SIGNIFICANT CHANGE UP (ref 3.8–10.5)
WBC # FLD AUTO: 9.6 K/UL — SIGNIFICANT CHANGE UP (ref 3.8–10.5)

## 2018-12-11 PROCEDURE — 93010 ELECTROCARDIOGRAM REPORT: CPT

## 2018-12-11 PROCEDURE — 99291 CRITICAL CARE FIRST HOUR: CPT

## 2018-12-11 PROCEDURE — 33518 CABG ARTERY-VEIN TWO: CPT

## 2018-12-11 PROCEDURE — 33518 CABG ARTERY-VEIN TWO: CPT | Mod: AS

## 2018-12-11 PROCEDURE — 33533 CABG ARTERIAL SINGLE: CPT | Mod: AS

## 2018-12-11 PROCEDURE — 71045 X-RAY EXAM CHEST 1 VIEW: CPT | Mod: 26

## 2018-12-11 PROCEDURE — 94010 BREATHING CAPACITY TEST: CPT | Mod: 26

## 2018-12-11 PROCEDURE — 33533 CABG ARTERIAL SINGLE: CPT

## 2018-12-11 PROCEDURE — 33508 ENDOSCOPIC VEIN HARVEST: CPT

## 2018-12-11 PROCEDURE — 33508 ENDOSCOPIC VEIN HARVEST: CPT | Mod: AS

## 2018-12-11 RX ORDER — CHLORHEXIDINE GLUCONATE 213 G/1000ML
5 SOLUTION TOPICAL EVERY 4 HOURS
Qty: 0 | Refills: 0 | Status: DISCONTINUED | OUTPATIENT
Start: 2018-12-11 | End: 2018-12-12

## 2018-12-11 RX ORDER — DEXTROSE 50 % IN WATER 50 %
25 SYRINGE (ML) INTRAVENOUS
Qty: 0 | Refills: 0 | Status: DISCONTINUED | OUTPATIENT
Start: 2018-12-11 | End: 2018-12-17

## 2018-12-11 RX ORDER — ASPIRIN/CALCIUM CARB/MAGNESIUM 324 MG
300 TABLET ORAL ONCE
Qty: 0 | Refills: 0 | Status: DISCONTINUED | OUTPATIENT
Start: 2018-12-11 | End: 2018-12-12

## 2018-12-11 RX ORDER — ALBUMIN HUMAN 25 %
250 VIAL (ML) INTRAVENOUS ONCE
Qty: 0 | Refills: 0 | Status: COMPLETED | OUTPATIENT
Start: 2018-12-11 | End: 2018-12-11

## 2018-12-11 RX ORDER — NOREPINEPHRINE BITARTRATE/D5W 8 MG/250ML
0.05 PLASTIC BAG, INJECTION (ML) INTRAVENOUS
Qty: 8 | Refills: 0 | Status: DISCONTINUED | OUTPATIENT
Start: 2018-12-11 | End: 2018-12-12

## 2018-12-11 RX ORDER — CEFUROXIME AXETIL 250 MG
1500 TABLET ORAL EVERY 12 HOURS
Qty: 0 | Refills: 0 | Status: COMPLETED | OUTPATIENT
Start: 2018-12-11 | End: 2018-12-12

## 2018-12-11 RX ORDER — PANTOPRAZOLE SODIUM 20 MG/1
40 TABLET, DELAYED RELEASE ORAL DAILY
Qty: 0 | Refills: 0 | Status: DISCONTINUED | OUTPATIENT
Start: 2018-12-11 | End: 2018-12-12

## 2018-12-11 RX ORDER — POTASSIUM CHLORIDE 20 MEQ
10 PACKET (EA) ORAL
Qty: 0 | Refills: 0 | Status: DISCONTINUED | OUTPATIENT
Start: 2018-12-11 | End: 2018-12-15

## 2018-12-11 RX ORDER — DEXTROSE 50 % IN WATER 50 %
50 SYRINGE (ML) INTRAVENOUS
Qty: 0 | Refills: 0 | Status: DISCONTINUED | OUTPATIENT
Start: 2018-12-11 | End: 2018-12-17

## 2018-12-11 RX ORDER — DOBUTAMINE HCL 250MG/20ML
2.5 VIAL (ML) INTRAVENOUS
Qty: 500 | Refills: 0 | Status: DISCONTINUED | OUTPATIENT
Start: 2018-12-11 | End: 2018-12-12

## 2018-12-11 RX ORDER — MEPERIDINE HYDROCHLORIDE 50 MG/ML
25 INJECTION INTRAMUSCULAR; INTRAVENOUS; SUBCUTANEOUS ONCE
Qty: 0 | Refills: 0 | Status: DISCONTINUED | OUTPATIENT
Start: 2018-12-11 | End: 2018-12-12

## 2018-12-11 RX ORDER — ONDANSETRON 8 MG/1
4 TABLET, FILM COATED ORAL ONCE
Qty: 0 | Refills: 0 | Status: COMPLETED | OUTPATIENT
Start: 2018-12-11 | End: 2018-12-12

## 2018-12-11 RX ORDER — SODIUM CHLORIDE 9 MG/ML
1000 INJECTION INTRAMUSCULAR; INTRAVENOUS; SUBCUTANEOUS
Qty: 0 | Refills: 0 | Status: DISCONTINUED | OUTPATIENT
Start: 2018-12-11 | End: 2018-12-17

## 2018-12-11 RX ORDER — ACETAMINOPHEN 500 MG
1000 TABLET ORAL ONCE
Qty: 0 | Refills: 0 | Status: COMPLETED | OUTPATIENT
Start: 2018-12-11 | End: 2018-12-11

## 2018-12-11 RX ORDER — DOCUSATE SODIUM 100 MG
100 CAPSULE ORAL THREE TIMES A DAY
Qty: 0 | Refills: 0 | Status: DISCONTINUED | OUTPATIENT
Start: 2018-12-11 | End: 2018-12-17

## 2018-12-11 RX ORDER — VASOPRESSIN 20 [USP'U]/ML
0.1 INJECTION INTRAVENOUS
Qty: 100 | Refills: 0 | Status: DISCONTINUED | OUTPATIENT
Start: 2018-12-11 | End: 2018-12-12

## 2018-12-11 RX ORDER — METOCLOPRAMIDE HCL 10 MG
10 TABLET ORAL EVERY 8 HOURS
Qty: 0 | Refills: 0 | Status: COMPLETED | OUTPATIENT
Start: 2018-12-11 | End: 2018-12-13

## 2018-12-11 RX ORDER — INSULIN HUMAN 100 [IU]/ML
3 INJECTION, SOLUTION SUBCUTANEOUS
Qty: 100 | Refills: 0 | Status: DISCONTINUED | OUTPATIENT
Start: 2018-12-11 | End: 2018-12-12

## 2018-12-11 RX ORDER — POTASSIUM CHLORIDE 20 MEQ
10 PACKET (EA) ORAL
Qty: 0 | Refills: 0 | Status: DISCONTINUED | OUTPATIENT
Start: 2018-12-11 | End: 2018-12-12

## 2018-12-11 RX ADMIN — Medication 1000 MILLIGRAM(S): at 22:36

## 2018-12-11 RX ADMIN — Medication 1500 MILLILITER(S): at 18:21

## 2018-12-11 RX ADMIN — AMLODIPINE BESYLATE 10 MILLIGRAM(S): 2.5 TABLET ORAL at 05:19

## 2018-12-11 RX ADMIN — Medication 10 MILLIGRAM(S): at 22:12

## 2018-12-11 RX ADMIN — INSULIN HUMAN 3 UNIT(S)/HR: 100 INJECTION, SOLUTION SUBCUTANEOUS at 18:23

## 2018-12-11 RX ADMIN — VASOPRESSIN 6 UNIT(S)/MIN: 20 INJECTION INTRAVENOUS at 18:24

## 2018-12-11 RX ADMIN — Medication 200 MILLIGRAM(S): at 22:21

## 2018-12-11 RX ADMIN — CHLORHEXIDINE GLUCONATE 1 APPLICATION(S): 213 SOLUTION TOPICAL at 05:19

## 2018-12-11 RX ADMIN — Medication 6.09 MICROGRAM(S)/KG/MIN: at 18:24

## 2018-12-11 RX ADMIN — Medication 7.61 MICROGRAM(S)/KG/MIN: at 18:24

## 2018-12-11 RX ADMIN — Medication 100 MILLIGRAM(S): at 05:19

## 2018-12-11 RX ADMIN — CHLORHEXIDINE GLUCONATE 5 MILLILITER(S): 213 SOLUTION TOPICAL at 18:12

## 2018-12-11 NOTE — BRIEF OPERATIVE NOTE - POST-OP DX
Coronary artery disease involving native coronary artery of native heart, angina presence unspecified  12/11/2018    Active  Kamron Fraire

## 2018-12-11 NOTE — PROGRESS NOTE ADULT - SUBJECTIVE AND OBJECTIVE BOX
BOOGIE CANNON   MRN#: [id number]     The patient is a 84y Male who was seen, evaluated, & examined with the CTICU staff on rounds and later in the day with a multidisciplinary care plan formulated & implemented.  All available clinical, laboratory, radiographic, pharmacologic, and electrocardiographic data were reviewed & analyzed.      The patient was in the CTICU in critical condition secondary to acute respiratory failure-persistent cardiopulmonary dysfunction, hemodynamically significant hypovolemia-shock, and hyperkalemia-stress hyperglycemia.      Respiratory failure required full ventilatory support, the following of ABG’s with A-line monitoring, and continuous pulse oximetry monitoring for support & to evaluate for & prevent further decompensation secondary to persistent cardiopulmonary dysfunction.     Invasive hemodynamic monitoring with an A-line was required for the following of continuous MAP/BP monitoring to ensure adequate cardiovascular support and to evaluate for & help prevent decompensation while receiving intermittent volume expansion, an IV Levophed drip, and an IV Vasopressin drip secondary to hemodynamically significant hypovolemia-shock and acute on chronic kidney injury-failure-possible contrast induced ATN.       Metabolic stability, hyperkalemia-stress hyperglycemia, & infection prophylaxis required an IV regular Insulin drip & the following of serial glucose levels to help achieve & maintain euglycemia.      Patient required critical care management and I provided 30 minutes of non-continuous care to the patient.  Discussed at length with the CTICU staff and helped coordinate care.

## 2018-12-11 NOTE — PRE-ANESTHESIA EVALUATION ADULT - NS MD HP INPLANTS MED DEV
79M PMH CVA with chronic residual weakness RUE, afib (on coumadin), CHF (diastolic and systolic), DM, HTN presenting to ED with confusion and unresponsiveness 79M PMH CVA with chronic residual weakness RUE, afib (on coumadin), CHF (diastolic and systolic), DM, HTN presenting to ED with confusion and unresponsiveness 79M PMH CVA with chronic residual weakness RUE, afib (on coumadin), CHF (diastolic and systolic), DM, HTN presenting to ED with confusion and unresponsiveness this morning, admitted to tele to r/o Stroke. 79M PMH CVA with chronic residual weakness RUE, afib (on coumadin), CHF (diastolic and systolic), DM, HTN presenting to ED with confusion and unresponsiveness this morning, admitted to tele to r/o Stroke. 79M PMH CVA with chronic residual weakness RUE, afib (on coumadin), CHF (diastolic and systolic), DM, HTN presenting to ED with confusion and unresponsiveness this morning, admitted to tele to r/o Stroke. 79M PMH CVA with chronic residual weakness RUE, afib (on coumadin), CHF (diastolic and systolic), DM, HTN presenting to ED with confusion and unresponsiveness this morning, admitted to tele to r/o Stroke. 79M PMH CVA with chronic residual weakness RUE, afib (on coumadin), CHF (diastolic and systolic), DM, HTN presenting to ED with confusion and unresponsiveness this morning, admitted to tele to r/o Stroke. 79M PMH CVA with chronic residual weakness RUE, afib (on coumadin), CHF (diastolic and systolic), DM, HTN presenting to ED with confusion and unresponsiveness this morning, admitted to tele to r/o Stroke. None

## 2018-12-11 NOTE — BRIEF OPERATIVE NOTE - PROCEDURE
<<-----Click on this checkbox to enter Procedure CABG (coronary artery bypass graft)  12/11/2018  CABG x 3 LIMA to LAD, SVG to RCA, SVG to Circumflex  Active  [id number]

## 2018-12-12 DIAGNOSIS — Z95.1 PRESENCE OF AORTOCORONARY BYPASS GRAFT: ICD-10-CM

## 2018-12-12 LAB
ALBUMIN SERPL ELPH-MCNC: 3.4 G/DL — SIGNIFICANT CHANGE UP (ref 3.3–5)
ALP SERPL-CCNC: 45 U/L — SIGNIFICANT CHANGE UP (ref 40–120)
ALT FLD-CCNC: 9 U/L — LOW (ref 10–45)
ANION GAP SERPL CALC-SCNC: 12 MMOL/L — SIGNIFICANT CHANGE UP (ref 5–17)
APTT BLD: 27.3 SEC — LOW (ref 27.5–36.3)
AST SERPL-CCNC: 29 U/L — SIGNIFICANT CHANGE UP (ref 10–40)
BILIRUB SERPL-MCNC: 0.5 MG/DL — SIGNIFICANT CHANGE UP (ref 0.2–1.2)
BUN SERPL-MCNC: 28 MG/DL — HIGH (ref 7–23)
CALCIUM SERPL-MCNC: 8 MG/DL — LOW (ref 8.4–10.5)
CHLORIDE SERPL-SCNC: 110 MMOL/L — HIGH (ref 96–108)
CO2 SERPL-SCNC: 20 MMOL/L — LOW (ref 22–31)
CREAT SERPL-MCNC: 1.54 MG/DL — HIGH (ref 0.5–1.3)
GAS PNL BLDA: SIGNIFICANT CHANGE UP
GLUCOSE BLDC GLUCOMTR-MCNC: 104 MG/DL — HIGH (ref 70–99)
GLUCOSE BLDC GLUCOMTR-MCNC: 108 MG/DL — HIGH (ref 70–99)
GLUCOSE BLDC GLUCOMTR-MCNC: 109 MG/DL — HIGH (ref 70–99)
GLUCOSE BLDC GLUCOMTR-MCNC: 109 MG/DL — HIGH (ref 70–99)
GLUCOSE BLDC GLUCOMTR-MCNC: 123 MG/DL — HIGH (ref 70–99)
GLUCOSE BLDC GLUCOMTR-MCNC: 128 MG/DL — HIGH (ref 70–99)
GLUCOSE BLDC GLUCOMTR-MCNC: 133 MG/DL — HIGH (ref 70–99)
GLUCOSE BLDC GLUCOMTR-MCNC: 138 MG/DL — HIGH (ref 70–99)
GLUCOSE BLDC GLUCOMTR-MCNC: 161 MG/DL — HIGH (ref 70–99)
GLUCOSE BLDC GLUCOMTR-MCNC: 50 MG/DL — LOW (ref 70–99)
GLUCOSE BLDC GLUCOMTR-MCNC: 88 MG/DL — SIGNIFICANT CHANGE UP (ref 70–99)
GLUCOSE SERPL-MCNC: 126 MG/DL — HIGH (ref 70–99)
HCT VFR BLD CALC: 24.4 % — LOW (ref 39–50)
HGB BLD-MCNC: 8.6 G/DL — LOW (ref 13–17)
INR BLD: 1.2 RATIO — HIGH (ref 0.88–1.16)
MCHC RBC-ENTMCNC: 30.5 PG — SIGNIFICANT CHANGE UP (ref 27–34)
MCHC RBC-ENTMCNC: 35.2 GM/DL — SIGNIFICANT CHANGE UP (ref 32–36)
MCV RBC AUTO: 86.5 FL — SIGNIFICANT CHANGE UP (ref 80–100)
PHOSPHATE SERPL-MCNC: 4.2 MG/DL — SIGNIFICANT CHANGE UP (ref 2.5–4.5)
PLATELET # BLD AUTO: 98 K/UL — LOW (ref 150–400)
POTASSIUM SERPL-MCNC: 5.2 MMOL/L — SIGNIFICANT CHANGE UP (ref 3.5–5.3)
POTASSIUM SERPL-SCNC: 5.2 MMOL/L — SIGNIFICANT CHANGE UP (ref 3.5–5.3)
PROT SERPL-MCNC: 4.9 G/DL — LOW (ref 6–8.3)
PROTHROM AB SERPL-ACNC: 13.7 SEC — HIGH (ref 10–12.9)
RBC # BLD: 2.82 M/UL — LOW (ref 4.2–5.8)
RBC # FLD: 13.8 % — SIGNIFICANT CHANGE UP (ref 10.3–14.5)
SODIUM SERPL-SCNC: 142 MMOL/L — SIGNIFICANT CHANGE UP (ref 135–145)
WBC # BLD: 8.4 K/UL — SIGNIFICANT CHANGE UP (ref 3.8–10.5)
WBC # FLD AUTO: 8.4 K/UL — SIGNIFICANT CHANGE UP (ref 3.8–10.5)

## 2018-12-12 PROCEDURE — 93010 ELECTROCARDIOGRAM REPORT: CPT

## 2018-12-12 PROCEDURE — 71045 X-RAY EXAM CHEST 1 VIEW: CPT | Mod: 26

## 2018-12-12 RX ORDER — HEPARIN SODIUM 5000 [USP'U]/ML
5000 INJECTION INTRAVENOUS; SUBCUTANEOUS EVERY 8 HOURS
Qty: 0 | Refills: 0 | Status: DISCONTINUED | OUTPATIENT
Start: 2018-12-12 | End: 2018-12-17

## 2018-12-12 RX ORDER — ASPIRIN/CALCIUM CARB/MAGNESIUM 324 MG
81 TABLET ORAL DAILY
Qty: 0 | Refills: 0 | Status: DISCONTINUED | OUTPATIENT
Start: 2018-12-12 | End: 2018-12-17

## 2018-12-12 RX ORDER — INSULIN LISPRO 100/ML
VIAL (ML) SUBCUTANEOUS
Qty: 0 | Refills: 0 | Status: DISCONTINUED | OUTPATIENT
Start: 2018-12-12 | End: 2018-12-17

## 2018-12-12 RX ORDER — OXYCODONE AND ACETAMINOPHEN 5; 325 MG/1; MG/1
2 TABLET ORAL EVERY 6 HOURS
Qty: 0 | Refills: 0 | Status: DISCONTINUED | OUTPATIENT
Start: 2018-12-12 | End: 2018-12-17

## 2018-12-12 RX ORDER — METOPROLOL TARTRATE 50 MG
25 TABLET ORAL
Qty: 0 | Refills: 0 | Status: DISCONTINUED | OUTPATIENT
Start: 2018-12-12 | End: 2018-12-12

## 2018-12-12 RX ORDER — POLYETHYLENE GLYCOL 3350 17 G/17G
17 POWDER, FOR SOLUTION ORAL DAILY
Qty: 0 | Refills: 0 | Status: DISCONTINUED | OUTPATIENT
Start: 2018-12-12 | End: 2018-12-17

## 2018-12-12 RX ORDER — PANTOPRAZOLE SODIUM 20 MG/1
40 TABLET, DELAYED RELEASE ORAL
Qty: 0 | Refills: 0 | Status: DISCONTINUED | OUTPATIENT
Start: 2018-12-12 | End: 2018-12-17

## 2018-12-12 RX ORDER — FERROUS SULFATE 325(65) MG
325 TABLET ORAL THREE TIMES A DAY
Qty: 0 | Refills: 0 | Status: DISCONTINUED | OUTPATIENT
Start: 2018-12-12 | End: 2018-12-17

## 2018-12-12 RX ORDER — FOLIC ACID 0.8 MG
1 TABLET ORAL DAILY
Qty: 0 | Refills: 0 | Status: DISCONTINUED | OUTPATIENT
Start: 2018-12-12 | End: 2018-12-17

## 2018-12-12 RX ORDER — HYDROMORPHONE HYDROCHLORIDE 2 MG/ML
0.5 INJECTION INTRAMUSCULAR; INTRAVENOUS; SUBCUTANEOUS ONCE
Qty: 0 | Refills: 0 | Status: DISCONTINUED | OUTPATIENT
Start: 2018-12-12 | End: 2018-12-12

## 2018-12-12 RX ORDER — ATORVASTATIN CALCIUM 80 MG/1
40 TABLET, FILM COATED ORAL AT BEDTIME
Qty: 0 | Refills: 0 | Status: DISCONTINUED | OUTPATIENT
Start: 2018-12-12 | End: 2018-12-17

## 2018-12-12 RX ORDER — OXYCODONE AND ACETAMINOPHEN 5; 325 MG/1; MG/1
1 TABLET ORAL EVERY 6 HOURS
Qty: 0 | Refills: 0 | Status: DISCONTINUED | OUTPATIENT
Start: 2018-12-12 | End: 2018-12-17

## 2018-12-12 RX ORDER — ASCORBIC ACID 60 MG
500 TABLET,CHEWABLE ORAL
Qty: 0 | Refills: 0 | Status: DISCONTINUED | OUTPATIENT
Start: 2018-12-12 | End: 2018-12-17

## 2018-12-12 RX ORDER — FENTANYL CITRATE 50 UG/ML
25 INJECTION INTRAVENOUS ONCE
Qty: 0 | Refills: 0 | Status: DISCONTINUED | OUTPATIENT
Start: 2018-12-12 | End: 2018-12-12

## 2018-12-12 RX ADMIN — Medication 10 MILLIGRAM(S): at 21:34

## 2018-12-12 RX ADMIN — Medication 100 MILLIGRAM(S): at 13:39

## 2018-12-12 RX ADMIN — FENTANYL CITRATE 25 MICROGRAM(S): 50 INJECTION INTRAVENOUS at 04:00

## 2018-12-12 RX ADMIN — VASOPRESSIN 6 UNIT(S)/MIN: 20 INJECTION INTRAVENOUS at 05:44

## 2018-12-12 RX ADMIN — FENTANYL CITRATE 25 MICROGRAM(S): 50 INJECTION INTRAVENOUS at 03:45

## 2018-12-12 RX ADMIN — Medication 1 MILLIGRAM(S): at 17:25

## 2018-12-12 RX ADMIN — Medication 100 MILLIGRAM(S): at 00:29

## 2018-12-12 RX ADMIN — HYDROMORPHONE HYDROCHLORIDE 0.5 MILLIGRAM(S): 2 INJECTION INTRAMUSCULAR; INTRAVENOUS; SUBCUTANEOUS at 10:45

## 2018-12-12 RX ADMIN — INSULIN HUMAN 3 UNIT(S)/HR: 100 INJECTION, SOLUTION SUBCUTANEOUS at 05:41

## 2018-12-12 RX ADMIN — ATORVASTATIN CALCIUM 40 MILLIGRAM(S): 80 TABLET, FILM COATED ORAL at 21:34

## 2018-12-12 RX ADMIN — Medication 6.09 MICROGRAM(S)/KG/MIN: at 05:42

## 2018-12-12 RX ADMIN — Medication 7.61 MICROGRAM(S)/KG/MIN: at 05:41

## 2018-12-12 RX ADMIN — Medication 2: at 13:40

## 2018-12-12 RX ADMIN — ONDANSETRON 4 MILLIGRAM(S): 8 TABLET, FILM COATED ORAL at 00:29

## 2018-12-12 RX ADMIN — HYDROMORPHONE HYDROCHLORIDE 0.5 MILLIGRAM(S): 2 INJECTION INTRAMUSCULAR; INTRAVENOUS; SUBCUTANEOUS at 10:30

## 2018-12-12 RX ADMIN — Medication 10 MILLIGRAM(S): at 15:55

## 2018-12-12 RX ADMIN — Medication 500 MILLIGRAM(S): at 17:25

## 2018-12-12 RX ADMIN — Medication 10 MILLIGRAM(S): at 05:40

## 2018-12-12 RX ADMIN — OXYCODONE AND ACETAMINOPHEN 2 TABLET(S): 5; 325 TABLET ORAL at 21:33

## 2018-12-12 RX ADMIN — Medication 100 MILLIGRAM(S): at 17:25

## 2018-12-12 RX ADMIN — PANTOPRAZOLE SODIUM 40 MILLIGRAM(S): 20 TABLET, DELAYED RELEASE ORAL at 08:54

## 2018-12-12 RX ADMIN — Medication 100 MILLIGRAM(S): at 15:54

## 2018-12-12 RX ADMIN — Medication 81 MILLIGRAM(S): at 13:39

## 2018-12-12 RX ADMIN — Medication 5: at 17:39

## 2018-12-12 RX ADMIN — HEPARIN SODIUM 5000 UNIT(S): 5000 INJECTION INTRAVENOUS; SUBCUTANEOUS at 21:34

## 2018-12-12 RX ADMIN — OXYCODONE AND ACETAMINOPHEN 2 TABLET(S): 5; 325 TABLET ORAL at 22:27

## 2018-12-12 RX ADMIN — Medication 100 MILLIGRAM(S): at 21:34

## 2018-12-12 RX ADMIN — Medication 325 MILLIGRAM(S): at 21:35

## 2018-12-12 RX ADMIN — Medication 2: at 21:45

## 2018-12-12 RX ADMIN — HEPARIN SODIUM 5000 UNIT(S): 5000 INJECTION INTRAVENOUS; SUBCUTANEOUS at 15:55

## 2018-12-12 RX ADMIN — Medication 100 MILLIGRAM(S): at 05:40

## 2018-12-12 NOTE — PHYSICAL THERAPY INITIAL EVALUATION ADULT - PLANNED THERAPY INTERVENTIONS, PT EVAL
1. LTG Pt will be indep to neg 17 steps using  and step to gait w/in 2 wks/transfer training/gait training/bed mobility training

## 2018-12-12 NOTE — PHYSICAL THERAPY INITIAL EVALUATION ADULT - PERTINENT HX OF CURRENT PROBLEM, REHAB EVAL
83 y/o  male, former cigarette smoker - remote past < 5PY with PMHx of HTN, HLD, BPH and Basal Cell Carcinoma of the skin presently with lesion on the LLE scheduled for excision, offering c/o exertional dyspnea (possible anginal equivalent) x 2 months.  S/p card cath +CAD. Now s/p C3

## 2018-12-12 NOTE — PROGRESS NOTE ADULT - ASSESSMENT
83 y/o  male, former cigarette smoker - remote past < 5PY with PMHx of HTN, HLD, BPH and Basal Cell Carcinoma of the skin presently with lesion on the LLE scheduled for excision, offering c/o exertional dyspnea (possible anginal equivalent) x 2 months.  He was evaluated by his Cardiologist - Dr. Connors and had stress/echo on 11/30/2018 revealing inferolateral wall, apical inferior wall and apical anterior wall that do not augment with exercise.  Patient is now for [facility] for which he presents today.      TTE 9/2018 LVEF 65-70%, Mild MR 83 y/o  male, former cigarette smoker - remote past < 5PY with PMHx of HTN, HLD, BPH and Basal Cell Carcinoma of the skin presently with lesion on the LLE scheduled for excision, offering c/o exertional dyspnea (possible anginal equivalent) x 2 months.  He was evaluated by his Cardiologist - Dr. Connors and had stress/echo on 11/30/2018 revealing inferolateral wall, apical inferior wall and apical anterior wall that do not augment with exercise.  Patient is now for [facility] for which he presents today.      TTE 9/2018 LVEF 65-70%, Mild MR  CT Surgery consulted for CABG eval   Patient underwent LHC with evidence of possible LM disease and with LAD and LCx vessel disease. Patient is planned for IVUS of left main artery on Monday.    12/11/2018  CABG x 3 LIMA to LAD, SVG to RCA, SVG to Circumflex  nml ef   Pressors inotropes  Acute on chronic kidney dz  IV insulin  Extubated d 0  Bradycardia, on Dobutrex,  Weaned off.   No beta blocker d/t clary  Transferred to sdu  Iron and folate for anemia.

## 2018-12-12 NOTE — PHYSICAL THERAPY INITIAL EVALUATION ADULT - ADDITIONAL COMMENTS
Pt lives w/ wife in a 2 level town house w/ 17 steps (elevator w/in home). PTA indep w/o an assist device.

## 2018-12-12 NOTE — PROGRESS NOTE ADULT - SUBJECTIVE AND OBJECTIVE BOX
VITAL SIGNS    Telemetry:    Vital Signs Last 24 Hrs  T(C): 37.2 (18 @ 12:00), Max: 37.2 (18 @ 12:00)  T(F): 99 (18 @ 12:00), Max: 99 (18 @ 12:00)  HR: 68 (18 @ 16:00) (52 - 80)  BP: 108/80 (18 @ 15:00) (108/80 - 148/68)  RR: 25 (18 @ 16:00) (7 - 30)  SpO2: 100% (18 @ 16:00) (88% - 100%)                       8.6<L>                142  | 20<L>| 28<H>        8.4   >-----------< 98<L>   ------------------------< 126<H>                 24.4<L>                5.2  | 110<H>| 1.54<H>                                                                     Ca 8.0<L> Mg x     Ph 4.2      ,             9.6<L>                143  | 19<L>| 25<H>        9.6   >-----------< 115<L>   ------------------------< 124<H>                 27.3<L>                4.8  | 110<H>| 1.23                                                                      Ca 8.2<L> Mg 2.6   Ph 2.8                Daily     Daily Weight in k (12 Dec 2018 00:00)        CAPILLARY BLOOD GLUCOSE  128 (12 Dec 2018 13:00)      POCT Blood Glucose.: 128 mg/dL (12 Dec 2018 13:07)  POCT Blood Glucose.: 108 mg/dL (12 Dec 2018 09:02)  POCT Blood Glucose.: 88 mg/dL (12 Dec 2018 08:18)  POCT Blood Glucose.: 109 mg/dL (12 Dec 2018 06:57)  POCT Blood Glucose.: 50 mg/dL (12 Dec 2018 06:54)  POCT Blood Glucose.: 109 mg/dL (12 Dec 2018 06:15)  POCT Blood Glucose.: 133 mg/dL (12 Dec 2018 04:06)  POCT Blood Glucose.: 104 mg/dL (12 Dec 2018 02:54)  POCT Blood Glucose.: 123 mg/dL (12 Dec 2018 02:10)  POCT Blood Glucose.: 105 mg/dL (11 Dec 2018 23:57)  POCT Blood Glucose.: 121 mg/dL (11 Dec 2018 23:02)                Coumadin    [ ] YES          [  ]      NO                                   PHYSICAL EXAM        Neurology: alert and oriented x 3, nonfocal, no gross deficits  CV : .S1S2 RRR  Sternal Wound :  CDI , Stable  Pacing Wires        [  ]   Settings:                                  Isolated  [  ]  Lungs: bibasilar crackles   Drains:     MS         [  ] Drainage:                 L Pleural  [  ]  Drainage:                R Pleural  [  ]  Drainage:  Abdomen: soft, nontender, nondistended, positive bowel sounds, last bowel movement   :         voiding    Extremities:               aspirin enteric coated 81 milliGRAM(s) Oral daily  atorvastatin 40 milliGRAM(s) Oral at bedtime  cefuroxime  IVPB 1500 milliGRAM(s) IV Intermittent every 12 hours  dextrose 50% Injectable 50 milliLiter(s) IV Push every 15 minutes  dextrose 50% Injectable 25 milliLiter(s) IV Push every 15 minutes  docusate sodium 100 milliGRAM(s) Oral three times a day  heparin  Injectable 5000 Unit(s) SubCutaneous every 8 hours  insulin lispro (HumaLOG) corrective regimen sliding scale   SubCutaneous Before meals and at bedtime  metoclopramide Injectable 10 milliGRAM(s) IV Push every 8 hours  pantoprazole    Tablet 40 milliGRAM(s) Oral before breakfast  potassium chloride  10 mEq/50 mL IVPB 10 milliEquivalent(s) IV Intermittent every 1 hour  potassium chloride  10 mEq/50 mL IVPB 10 milliEquivalent(s) IV Intermittent every 1 hour  sodium chloride 0.9%. 1000 milliLiter(s) IV Continuous <Continuous>                    Physical Therapy Rec:   Home  [  ]   Home w/ PT  [  ]  Rehab  [  ]  Discussed with Cardiothoracic Team at AM rounds. im ok  VITAL SIGNS    Telemetry:  nsr 60  Vital Signs Last 24 Hrs  T(C): 37.2 (18 @ 12:00), Max: 37.2 (18 @ 12:00)  T(F): 99 (18 @ 12:00), Max: 99 (18 @ 12:00)  HR: 68 (18 @ 16:00) (52 - 80)  BP: 108/80 (18 @ 15:00) (108/80 - 148/68)  RR: 25 (18 @ 16:00) (7 - 30)  SpO2: 100% (18 @ 16:00) (88% - 100%)                       8.6<L>                142  | 20<L>| 28<H>        8.4   >-----------< 98<L>   ------------------------< 126<H>                 24.4<L>                5.2  | 110<H>| 1.54<H>                                                                     Ca 8.0<L> Mg x     Ph 4.2      ,             9.6<L>                143  | 19<L>| 25<H>        9.6   >-----------< 115<L>   ------------------------< 124<H>                 27.3<L>                4.8  | 110<H>| 1.23                                                                      Ca 8.2<L> Mg 2.6   Ph 2.8                Daily     Daily Weight in k (12 Dec 2018 00:00)        CAPILLARY BLOOD GLUCOSE  128 (12 Dec 2018 13:00)      POCT Blood Glucose.: 128 mg/dL (12 Dec 2018 13:07)  POCT Blood Glucose.: 108 mg/dL (12 Dec 2018 09:02)  POCT Blood Glucose.: 88 mg/dL (12 Dec 2018 08:18)  POCT Blood Glucose.: 109 mg/dL (12 Dec 2018 06:57)  POCT Blood Glucose.: 50 mg/dL (12 Dec 2018 06:54)  POCT Blood Glucose.: 109 mg/dL (12 Dec 2018 06:15)  POCT Blood Glucose.: 133 mg/dL (12 Dec 2018 04:06)  POCT Blood Glucose.: 104 mg/dL (12 Dec 2018 02:54)  POCT Blood Glucose.: 123 mg/dL (12 Dec 2018 02:10)  POCT Blood Glucose.: 105 mg/dL (11 Dec 2018 23:57)  POCT Blood Glucose.: 121 mg/dL (11 Dec 2018 23:02)                Coumadin    [ ] YES          [  x]      NO                                   PHYSICAL EXAM        Neurology: alert and oriented x 3, nonfocal, no gross deficits  CV : .S1S2 RRR  Sternal Wound :  CDI , Stable  Pacing Wires        [  x]   Settings:                                  Isolated  [  ]  Lungs: bibasilar diminished  Drains:     MS         [  ] Drainage:                 L Pleural  x ]  Drainage:                R Pleural  [  ]  Drainage:  Abdomen: soft, nontender, nondistended, positive bowel sounds, last bowel movement preop  :         voiding    Extremities:     Trace edema - calve tenderness  leg ace wrap cdi          aspirin enteric coated 81 milliGRAM(s) Oral daily  atorvastatin 40 milliGRAM(s) Oral at bedtime  cefuroxime  IVPB 1500 milliGRAM(s) IV Intermittent every 12 hours  dextrose 50% Injectable 50 milliLiter(s) IV Push every 15 minutes  dextrose 50% Injectable 25 milliLiter(s) IV Push every 15 minutes  docusate sodium 100 milliGRAM(s) Oral three times a day  heparin  Injectable 5000 Unit(s) SubCutaneous every 8 hours  insulin lispro (HumaLOG) corrective regimen sliding scale   SubCutaneous Before meals and at bedtime  metoclopramide Injectable 10 milliGRAM(s) IV Push every 8 hours  pantoprazole    Tablet 40 milliGRAM(s) Oral before breakfast  potassium chloride  10 mEq/50 mL IVPB 10 milliEquivalent(s) IV Intermittent every 1 hour  potassium chloride  10 mEq/50 mL IVPB 10 milliEquivalent(s) IV Intermittent every 1 hour  sodium chloride 0.9%. 1000 milliLiter(s) IV Continuous <Continuous>                    Physical Therapy Rec:   Home  [  ]   Home w/ PT  [  ]  Rehab  [  ]  Discussed with Cardiothoracic Team at AM rounds.

## 2018-12-13 DIAGNOSIS — Z29.9 ENCOUNTER FOR PROPHYLACTIC MEASURES, UNSPECIFIED: ICD-10-CM

## 2018-12-13 DIAGNOSIS — D50.0 IRON DEFICIENCY ANEMIA SECONDARY TO BLOOD LOSS (CHRONIC): ICD-10-CM

## 2018-12-13 DIAGNOSIS — I97.89 OTHER POSTPROCEDURAL COMPLICATIONS AND DISORDERS OF THE CIRCULATORY SYSTEM, NOT ELSEWHERE CLASSIFIED: ICD-10-CM

## 2018-12-13 LAB
ALBUMIN SERPL ELPH-MCNC: 3.4 G/DL — SIGNIFICANT CHANGE UP (ref 3.3–5)
ALP SERPL-CCNC: 51 U/L — SIGNIFICANT CHANGE UP (ref 40–120)
ALT FLD-CCNC: 14 U/L — SIGNIFICANT CHANGE UP (ref 10–45)
ANION GAP SERPL CALC-SCNC: 14 MMOL/L — SIGNIFICANT CHANGE UP (ref 5–17)
ANION GAP SERPL CALC-SCNC: 17 MMOL/L — SIGNIFICANT CHANGE UP (ref 5–17)
AST SERPL-CCNC: 22 U/L — SIGNIFICANT CHANGE UP (ref 10–40)
BILIRUB SERPL-MCNC: 0.5 MG/DL — SIGNIFICANT CHANGE UP (ref 0.2–1.2)
BUN SERPL-MCNC: 43 MG/DL — HIGH (ref 7–23)
BUN SERPL-MCNC: 48 MG/DL — HIGH (ref 7–23)
CALCIUM SERPL-MCNC: 7.8 MG/DL — LOW (ref 8.4–10.5)
CALCIUM SERPL-MCNC: 8.1 MG/DL — LOW (ref 8.4–10.5)
CHLORIDE SERPL-SCNC: 101 MMOL/L — SIGNIFICANT CHANGE UP (ref 96–108)
CHLORIDE SERPL-SCNC: 105 MMOL/L — SIGNIFICANT CHANGE UP (ref 96–108)
CO2 SERPL-SCNC: 17 MMOL/L — LOW (ref 22–31)
CO2 SERPL-SCNC: 21 MMOL/L — LOW (ref 22–31)
CREAT SERPL-MCNC: 1.94 MG/DL — HIGH (ref 0.5–1.3)
CREAT SERPL-MCNC: 1.98 MG/DL — HIGH (ref 0.5–1.3)
GLUCOSE BLDC GLUCOMTR-MCNC: 134 MG/DL — HIGH (ref 70–99)
GLUCOSE BLDC GLUCOMTR-MCNC: 144 MG/DL — HIGH (ref 70–99)
GLUCOSE BLDC GLUCOMTR-MCNC: 144 MG/DL — HIGH (ref 70–99)
GLUCOSE BLDC GLUCOMTR-MCNC: 150 MG/DL — HIGH (ref 70–99)
GLUCOSE SERPL-MCNC: 155 MG/DL — HIGH (ref 70–99)
GLUCOSE SERPL-MCNC: 166 MG/DL — HIGH (ref 70–99)
HCT VFR BLD CALC: 24.3 % — LOW (ref 39–50)
HCT VFR BLD CALC: 26.1 % — LOW (ref 39–50)
HGB BLD-MCNC: 8.4 G/DL — LOW (ref 13–17)
HGB BLD-MCNC: 9 G/DL — LOW (ref 13–17)
MAGNESIUM SERPL-MCNC: 2.2 MG/DL — SIGNIFICANT CHANGE UP (ref 1.6–2.6)
MAGNESIUM SERPL-MCNC: 2.3 MG/DL — SIGNIFICANT CHANGE UP (ref 1.6–2.6)
MCHC RBC-ENTMCNC: 29.8 PG — SIGNIFICANT CHANGE UP (ref 27–34)
MCHC RBC-ENTMCNC: 30.1 PG — SIGNIFICANT CHANGE UP (ref 27–34)
MCHC RBC-ENTMCNC: 34.4 GM/DL — SIGNIFICANT CHANGE UP (ref 32–36)
MCHC RBC-ENTMCNC: 34.5 GM/DL — SIGNIFICANT CHANGE UP (ref 32–36)
MCV RBC AUTO: 86.4 FL — SIGNIFICANT CHANGE UP (ref 80–100)
MCV RBC AUTO: 87.5 FL — SIGNIFICANT CHANGE UP (ref 80–100)
PHOSPHATE SERPL-MCNC: 3.9 MG/DL — SIGNIFICANT CHANGE UP (ref 2.5–4.5)
PLATELET # BLD AUTO: 105 K/UL — LOW (ref 150–400)
PLATELET # BLD AUTO: 97 K/UL — LOW (ref 150–400)
POTASSIUM SERPL-MCNC: 4.2 MMOL/L — SIGNIFICANT CHANGE UP (ref 3.5–5.3)
POTASSIUM SERPL-MCNC: 4.5 MMOL/L — SIGNIFICANT CHANGE UP (ref 3.5–5.3)
POTASSIUM SERPL-SCNC: 4.2 MMOL/L — SIGNIFICANT CHANGE UP (ref 3.5–5.3)
POTASSIUM SERPL-SCNC: 4.5 MMOL/L — SIGNIFICANT CHANGE UP (ref 3.5–5.3)
PROT SERPL-MCNC: 5.3 G/DL — LOW (ref 6–8.3)
RBC # BLD: 2.78 M/UL — LOW (ref 4.2–5.8)
RBC # BLD: 3.02 M/UL — LOW (ref 4.2–5.8)
RBC # FLD: 13.7 % — SIGNIFICANT CHANGE UP (ref 10.3–14.5)
RBC # FLD: 13.7 % — SIGNIFICANT CHANGE UP (ref 10.3–14.5)
SODIUM SERPL-SCNC: 136 MMOL/L — SIGNIFICANT CHANGE UP (ref 135–145)
SODIUM SERPL-SCNC: 139 MMOL/L — SIGNIFICANT CHANGE UP (ref 135–145)
WBC # BLD: 9.3 K/UL — SIGNIFICANT CHANGE UP (ref 3.8–10.5)
WBC # BLD: 9.8 K/UL — SIGNIFICANT CHANGE UP (ref 3.8–10.5)
WBC # FLD AUTO: 9.3 K/UL — SIGNIFICANT CHANGE UP (ref 3.8–10.5)
WBC # FLD AUTO: 9.8 K/UL — SIGNIFICANT CHANGE UP (ref 3.8–10.5)

## 2018-12-13 PROCEDURE — 93010 ELECTROCARDIOGRAM REPORT: CPT

## 2018-12-13 PROCEDURE — 71045 X-RAY EXAM CHEST 1 VIEW: CPT | Mod: 26,76

## 2018-12-13 RX ORDER — AMIODARONE HYDROCHLORIDE 400 MG/1
400 TABLET ORAL EVERY 8 HOURS
Qty: 0 | Refills: 0 | Status: DISCONTINUED | OUTPATIENT
Start: 2018-12-13 | End: 2018-12-14

## 2018-12-13 RX ORDER — DOXAZOSIN MESYLATE 4 MG
4 TABLET ORAL AT BEDTIME
Qty: 0 | Refills: 0 | Status: DISCONTINUED | OUTPATIENT
Start: 2018-12-13 | End: 2018-12-17

## 2018-12-13 RX ORDER — METOPROLOL TARTRATE 50 MG
50 TABLET ORAL
Qty: 0 | Refills: 0 | Status: DISCONTINUED | OUTPATIENT
Start: 2018-12-13 | End: 2018-12-17

## 2018-12-13 RX ADMIN — Medication 2: at 07:50

## 2018-12-13 RX ADMIN — Medication 10 MILLIGRAM(S): at 05:37

## 2018-12-13 RX ADMIN — Medication 10 MILLIGRAM(S): at 13:27

## 2018-12-13 RX ADMIN — POLYETHYLENE GLYCOL 3350 17 GRAM(S): 17 POWDER, FOR SOLUTION ORAL at 12:17

## 2018-12-13 RX ADMIN — ATORVASTATIN CALCIUM 40 MILLIGRAM(S): 80 TABLET, FILM COATED ORAL at 21:46

## 2018-12-13 RX ADMIN — AMIODARONE HYDROCHLORIDE 400 MILLIGRAM(S): 400 TABLET ORAL at 19:43

## 2018-12-13 RX ADMIN — OXYCODONE AND ACETAMINOPHEN 1 TABLET(S): 5; 325 TABLET ORAL at 06:44

## 2018-12-13 RX ADMIN — Medication 50 MILLIGRAM(S): at 16:01

## 2018-12-13 RX ADMIN — Medication 100 MILLIGRAM(S): at 05:36

## 2018-12-13 RX ADMIN — Medication 4 MILLIGRAM(S): at 02:55

## 2018-12-13 RX ADMIN — PANTOPRAZOLE SODIUM 40 MILLIGRAM(S): 20 TABLET, DELAYED RELEASE ORAL at 05:36

## 2018-12-13 RX ADMIN — Medication 4 MILLIGRAM(S): at 21:46

## 2018-12-13 RX ADMIN — OXYCODONE AND ACETAMINOPHEN 1 TABLET(S): 5; 325 TABLET ORAL at 06:02

## 2018-12-13 RX ADMIN — Medication 100 MILLIGRAM(S): at 21:46

## 2018-12-13 RX ADMIN — Medication 1 MILLIGRAM(S): at 12:16

## 2018-12-13 RX ADMIN — Medication 500 MILLIGRAM(S): at 17:21

## 2018-12-13 RX ADMIN — HEPARIN SODIUM 5000 UNIT(S): 5000 INJECTION INTRAVENOUS; SUBCUTANEOUS at 13:28

## 2018-12-13 RX ADMIN — HEPARIN SODIUM 5000 UNIT(S): 5000 INJECTION INTRAVENOUS; SUBCUTANEOUS at 21:52

## 2018-12-13 RX ADMIN — Medication 500 MILLIGRAM(S): at 05:36

## 2018-12-13 RX ADMIN — Medication 325 MILLIGRAM(S): at 13:27

## 2018-12-13 RX ADMIN — HEPARIN SODIUM 5000 UNIT(S): 5000 INJECTION INTRAVENOUS; SUBCUTANEOUS at 05:36

## 2018-12-13 RX ADMIN — Medication 325 MILLIGRAM(S): at 21:46

## 2018-12-13 RX ADMIN — Medication 2: at 17:22

## 2018-12-13 RX ADMIN — Medication 100 MILLIGRAM(S): at 13:27

## 2018-12-13 RX ADMIN — Medication 81 MILLIGRAM(S): at 12:16

## 2018-12-13 RX ADMIN — Medication 325 MILLIGRAM(S): at 05:36

## 2018-12-13 RX ADMIN — Medication 2: at 12:16

## 2018-12-13 RX ADMIN — Medication 2: at 21:46

## 2018-12-13 NOTE — PROGRESS NOTE ADULT - ASSESSMENT
83 y/o  male, former cigarette smoker - remote past < 5PY with PMHx of HTN, HLD, BPH and Basal Cell Carcinoma of the skin presently with lesion on the LLE scheduled for excision, offering c/o exertional dyspnea (possible anginal equivalent) x 2 months.  He was evaluated by his Cardiologist - Dr. Connors and had stress/echo on 11/30/2018 revealing inferolateral wall, apical inferior wall and apical anterior wall that do not augment with exercise.  Patient is now for [facility] for which he presents today.      TTE 9/2018 LVEF 65-70%, Mild MR  CT Surgery consulted for CABG eval   Patient underwent LHC with evidence of possible LM disease and with LAD and LCx vessel disease. Patient is planned for IVUS of left main artery on Monday.    12/11/2018  CABG x 3 LIMA to LAD, SVG to RCA, SVG to Circumflex  nml ef   Pressors inotropes  Acute on chronic kidney dz  IV insulin  Extubated d 0  Bradycardia, on Dobutrex,  Weaned off.   No beta blocker d/t clary  Transferred to sdu  Iron and folate for anemia.  12/13 - HCT remains @ 24 - RBCs ordered- will d/c L Pl ct - ck cxr & transfer to floor - rounds made w/ Dr. Rob

## 2018-12-13 NOTE — PROGRESS NOTE ADULT - SUBJECTIVE AND OBJECTIVE BOX
VITAL SIGNS-Telemetry:  SR 60-70 9 bts WCT  Vital Signs Last 24 Hrs  T(C): 37 (18 @ 07:25), Max: 37.2 (18 @ 12:00)  T(F): 98.6 (18 @ 07:25), Max: 99 (18 @ 12:00)  HR: 76 (18 @ 07:25) (62 - 76)  BP: 133/61 (18 @ 07:25) (108/80 - 165/72)  RR: 18 (18 @ 07:25) (18 - 30)  SpO2: 94% (18 @ 07:25) (94% - 100%)          07:01  -   @ 07:00  --------------------------------------------------------  IN: 629.1 mL / OUT: 1155 mL / NET: -525.9 mL    Daily     Daily Weight in k.8 (13 Dec 2018 05:50)    CAPILLARY BLOOD GLUCOSE  128 (12 Dec 2018 13:00)  POCT Blood Glucose.: 150 mg/dL (13 Dec 2018 07:49)  POCT Blood Glucose.: 138 mg/dL (12 Dec 2018 21:44)  POCT Blood Glucose.: 161 mg/dL (12 Dec 2018 17:34)  POCT Blood Glucose.: 128 mg/dL (12 Dec 2018 13:07)        Drains: L Pleural  [ x ]  Drainage: 75cc total         Pacing Wires        [ x ]   Settings:                                  Isolated  [  ]  Coumadin    [ ] YES          [  ]      NO         Reason:       PHYSICAL EXAM:  Neurology: alert and oriented x 3, nonfocal, no gross deficits  CV : S1S2  Sternal Wound :  CDI , Stable  Lungs: CTA  Abdomen: soft, nontender, nondistended, positive bowel sounds, last bowel movement       pre-op + flatus  Extremities:     LLE inc cdi ace wrap - R calf 2x2 in place (pre-op Mohrs procedure)    ascorbic acid 500 milliGRAM(s) Oral two times a day  aspirin enteric coated 81 milliGRAM(s) Oral daily  atorvastatin 40 milliGRAM(s) Oral at bedtime  dextrose 50% Injectable 50 milliLiter(s) IV Push every 15 minutes  dextrose 50% Injectable 25 milliLiter(s) IV Push every 15 minutes  docusate sodium 100 milliGRAM(s) Oral three times a day  doxazosin 4 milliGRAM(s) Oral at bedtime  ferrous    sulfate 325 milliGRAM(s) Oral three times a day  folic acid 1 milliGRAM(s) Oral daily  heparin  Injectable 5000 Unit(s) SubCutaneous every 8 hours  insulin lispro (HumaLOG) corrective regimen sliding scale   SubCutaneous Before meals and at bedtime  metoclopramide Injectable 10 milliGRAM(s) IV Push every 8 hours  oxyCODONE    5 mG/acetaminophen 325 mG 1 Tablet(s) Oral every 6 hours PRN  oxyCODONE    5 mG/acetaminophen 325 mG 2 Tablet(s) Oral every 6 hours PRN  pantoprazole    Tablet 40 milliGRAM(s) Oral before breakfast  polyethylene glycol 3350 17 Gram(s) Oral daily  potassium chloride  10 mEq/50 mL IVPB 10 milliEquivalent(s) IV Intermittent every 1 hour  potassium chloride  10 mEq/50 mL IVPB 10 milliEquivalent(s) IV Intermittent every 1 hour  sodium chloride 0.9%. 1000 milliLiter(s) IV Continuous <Continuous>    Physical Therapy Rec:   Home  [  ]   Home w/ PT  [ x ]  Rehab  [  ]  Discussed with Cardiothoracic Team at AM rounds.

## 2018-12-14 DIAGNOSIS — I48.91 UNSPECIFIED ATRIAL FIBRILLATION: ICD-10-CM

## 2018-12-14 LAB
ANION GAP SERPL CALC-SCNC: 14 MMOL/L — SIGNIFICANT CHANGE UP (ref 5–17)
BUN SERPL-MCNC: 48 MG/DL — HIGH (ref 7–23)
CALCIUM SERPL-MCNC: 7.9 MG/DL — LOW (ref 8.4–10.5)
CHLORIDE SERPL-SCNC: 102 MMOL/L — SIGNIFICANT CHANGE UP (ref 96–108)
CO2 SERPL-SCNC: 21 MMOL/L — LOW (ref 22–31)
CREAT SERPL-MCNC: 1.94 MG/DL — HIGH (ref 0.5–1.3)
GLUCOSE BLDC GLUCOMTR-MCNC: 132 MG/DL — HIGH (ref 70–99)
GLUCOSE BLDC GLUCOMTR-MCNC: 143 MG/DL — HIGH (ref 70–99)
GLUCOSE BLDC GLUCOMTR-MCNC: 144 MG/DL — HIGH (ref 70–99)
GLUCOSE BLDC GLUCOMTR-MCNC: 148 MG/DL — HIGH (ref 70–99)
GLUCOSE SERPL-MCNC: 126 MG/DL — HIGH (ref 70–99)
HCT VFR BLD CALC: 25 % — LOW (ref 39–50)
HGB BLD-MCNC: 8.6 G/DL — LOW (ref 13–17)
MCHC RBC-ENTMCNC: 29.8 PG — SIGNIFICANT CHANGE UP (ref 27–34)
MCHC RBC-ENTMCNC: 34.5 GM/DL — SIGNIFICANT CHANGE UP (ref 32–36)
MCV RBC AUTO: 86.3 FL — SIGNIFICANT CHANGE UP (ref 80–100)
PLATELET # BLD AUTO: 101 K/UL — LOW (ref 150–400)
POTASSIUM SERPL-MCNC: 4.2 MMOL/L — SIGNIFICANT CHANGE UP (ref 3.5–5.3)
POTASSIUM SERPL-SCNC: 4.2 MMOL/L — SIGNIFICANT CHANGE UP (ref 3.5–5.3)
RBC # BLD: 2.9 M/UL — LOW (ref 4.2–5.8)
RBC # FLD: 13.1 % — SIGNIFICANT CHANGE UP (ref 10.3–14.5)
SODIUM SERPL-SCNC: 137 MMOL/L — SIGNIFICANT CHANGE UP (ref 135–145)
WBC # BLD: 8 K/UL — SIGNIFICANT CHANGE UP (ref 3.8–10.5)
WBC # FLD AUTO: 8 K/UL — SIGNIFICANT CHANGE UP (ref 3.8–10.5)

## 2018-12-14 PROCEDURE — 71045 X-RAY EXAM CHEST 1 VIEW: CPT | Mod: 26

## 2018-12-14 RX ORDER — SORBITOL SOLUTION 70 %
30 SOLUTION, ORAL MISCELLANEOUS ONCE
Qty: 0 | Refills: 0 | Status: COMPLETED | OUTPATIENT
Start: 2018-12-14 | End: 2018-12-14

## 2018-12-14 RX ORDER — AMIODARONE HYDROCHLORIDE 400 MG/1
200 TABLET ORAL
Qty: 0 | Refills: 0 | Status: DISCONTINUED | OUTPATIENT
Start: 2018-12-14 | End: 2018-12-17

## 2018-12-14 RX ADMIN — ATORVASTATIN CALCIUM 40 MILLIGRAM(S): 80 TABLET, FILM COATED ORAL at 22:03

## 2018-12-14 RX ADMIN — POLYETHYLENE GLYCOL 3350 17 GRAM(S): 17 POWDER, FOR SOLUTION ORAL at 12:17

## 2018-12-14 RX ADMIN — Medication 2: at 12:17

## 2018-12-14 RX ADMIN — PANTOPRAZOLE SODIUM 40 MILLIGRAM(S): 20 TABLET, DELAYED RELEASE ORAL at 05:24

## 2018-12-14 RX ADMIN — Medication 500 MILLIGRAM(S): at 05:23

## 2018-12-14 RX ADMIN — AMIODARONE HYDROCHLORIDE 400 MILLIGRAM(S): 400 TABLET ORAL at 13:35

## 2018-12-14 RX ADMIN — Medication 2: at 22:03

## 2018-12-14 RX ADMIN — Medication 100 MILLIGRAM(S): at 05:23

## 2018-12-14 RX ADMIN — Medication 81 MILLIGRAM(S): at 12:17

## 2018-12-14 RX ADMIN — Medication 325 MILLIGRAM(S): at 05:24

## 2018-12-14 RX ADMIN — Medication 4 MILLIGRAM(S): at 22:03

## 2018-12-14 RX ADMIN — HEPARIN SODIUM 5000 UNIT(S): 5000 INJECTION INTRAVENOUS; SUBCUTANEOUS at 22:03

## 2018-12-14 RX ADMIN — Medication 325 MILLIGRAM(S): at 22:03

## 2018-12-14 RX ADMIN — Medication 325 MILLIGRAM(S): at 13:35

## 2018-12-14 RX ADMIN — AMIODARONE HYDROCHLORIDE 400 MILLIGRAM(S): 400 TABLET ORAL at 05:23

## 2018-12-14 RX ADMIN — Medication 2: at 17:08

## 2018-12-14 RX ADMIN — Medication 1 MILLIGRAM(S): at 12:17

## 2018-12-14 RX ADMIN — Medication 30 MILLILITER(S): at 12:17

## 2018-12-14 RX ADMIN — Medication 500 MILLIGRAM(S): at 17:08

## 2018-12-14 RX ADMIN — HEPARIN SODIUM 5000 UNIT(S): 5000 INJECTION INTRAVENOUS; SUBCUTANEOUS at 05:23

## 2018-12-14 RX ADMIN — Medication 50 MILLIGRAM(S): at 17:08

## 2018-12-14 RX ADMIN — Medication 100 MILLIGRAM(S): at 13:35

## 2018-12-14 RX ADMIN — Medication 50 MILLIGRAM(S): at 05:23

## 2018-12-14 RX ADMIN — HEPARIN SODIUM 5000 UNIT(S): 5000 INJECTION INTRAVENOUS; SUBCUTANEOUS at 13:35

## 2018-12-14 RX ADMIN — Medication 2: at 08:10

## 2018-12-14 NOTE — DIETITIAN INITIAL EVALUATION ADULT. - PERTINENT MEDS FT
MEDICATIONS  (STANDING):  amiodarone    Tablet 400 milliGRAM(s) Oral every 8 hours  ascorbic acid 500 milliGRAM(s) Oral two times a day  aspirin enteric coated 81 milliGRAM(s) Oral daily  atorvastatin 40 milliGRAM(s) Oral at bedtime  dextrose 50% Injectable 50 milliLiter(s) IV Push every 15 minutes  dextrose 50% Injectable 25 milliLiter(s) IV Push every 15 minutes  docusate sodium 100 milliGRAM(s) Oral three times a day  doxazosin 4 milliGRAM(s) Oral at bedtime  ferrous    sulfate 325 milliGRAM(s) Oral three times a day  folic acid 1 milliGRAM(s) Oral daily  heparin  Injectable 5000 Unit(s) SubCutaneous every 8 hours  insulin lispro (HumaLOG) corrective regimen sliding scale   SubCutaneous Before meals and at bedtime  metoprolol tartrate 50 milliGRAM(s) Oral two times a day  pantoprazole    Tablet 40 milliGRAM(s) Oral before breakfast  polyethylene glycol 3350 17 Gram(s) Oral daily  potassium chloride  10 mEq/50 mL IVPB 10 milliEquivalent(s) IV Intermittent every 1 hour  potassium chloride  10 mEq/50 mL IVPB 10 milliEquivalent(s) IV Intermittent every 1 hour  sodium chloride 0.9%. 1000 milliLiter(s) (10 mL/Hr) IV Continuous <Continuous>  sorbitol 70% Solution 30 milliLiter(s) Oral once

## 2018-12-14 NOTE — DIETITIAN INITIAL EVALUATION ADULT. - NUTRITION INTERVENTION
Medical Food Supplements/Meals and Snack/Nutrition Education Nutrition Education/Collaboration and Referral of Nutrition Care/Meals and Snack/Medical Food Supplements

## 2018-12-14 NOTE — DIETITIAN INITIAL EVALUATION ADULT. - OTHER INFO
Pt seen for LOS. No c/o nausea/ vomiting or diarrhea. Has some constipation, last BM 3 days ago, bowel regimen meds in place. No difficulties chewing or swallowing reported. NKFA, no vitamins/supplements taken at home. Pt reports fair appetite, does not like most institutional food, however still consuming ~75% of trays as per pt and nursing flow sheet.

## 2018-12-14 NOTE — DIETITIAN INITIAL EVALUATION ADULT. - NS AS NUTRI INTERV COLLABORAT
Collaboration with other providers/Spoke with NP to remove consistent carbohydrate restriction as pt does not have history of DM, NP will change diet to low sodium

## 2018-12-14 NOTE — DIETITIAN INITIAL EVALUATION ADULT. - ENERGY NEEDS
83 y/o male PMHx of HTN, HLD, BPH and Basal Cell Carcinoma of the skin s/p CABG x 3 12/11/2018   Ht: 5'6        Wt: 180 lbs        BMI: 28.9 kg/m2         IBW: 136 lbs      %IBW:  132%  As per chart: no pressure ulcers;  +1 generalized edema

## 2018-12-14 NOTE — DIETITIAN INITIAL EVALUATION ADULT. - ADHERENCE
poor/Pt reports not following any dietary restrictions at home despite doctors/family recommending to limit sodium. Wife cooks most meals but pt likes take out foods such as chinese and italian.

## 2018-12-14 NOTE — PROGRESS NOTE ADULT - SUBJECTIVE AND OBJECTIVE BOX
VITAL SIGNS-Telemetry:  SR 60-80  Vital Signs Last 24 Hrs  T(C): 36.7 (18 @ 07:18), Max: 36.8 (18 @ 15:20)  T(F): 98 (18 @ 07:18), Max: 98.3 (18 @ 15:20)  HR: 63 (18 @ 07:18) (63 - 119)  BP: 125/60 (18 @ 07:18) (117/56 - 146/66)  RR: 18 (18 @ 07:18) (18 - 20)  SpO2: 96% (18 @ 07:18) (93% - 98%)          @ 07:01  -   @ 07:00  --------------------------------------------------------  IN: 1010 mL / OUT: 600 mL / NET: 410 mL     @ 07:01  -   @ 10:29  --------------------------------------------------------  IN: 240 mL / OUT: 0 mL / NET: 240 mL    Daily     Daily Weight in k.7 (14 Dec 2018 06:07)    CAPILLARY BLOOD GLUCOSE  POCT Blood Glucose.: 144 mg/dL (14 Dec 2018 07:53)  POCT Blood Glucose.: 134 mg/dL (13 Dec 2018 21:37)  POCT Blood Glucose.: 144 mg/dL (13 Dec 2018 16:33)  POCT Blood Glucose.: 144 mg/dL (13 Dec 2018 12:14)        Pacing Wires        [  ]   Settings:                                  Isolated  [  ]       PHYSICAL EXAM:  Neurology: alert and oriented x 3, nonfocal, no gross deficits  CV : S1S2  Sternal Wound :  CDI , Stable  Lungs: CTA  Abdomen: soft, nontender, nondistended, positive bowel sounds, last bowel movement         Extremities:     +edema b/l RLE INc cdi w/ ace wrap- R calf skin lesion w/ 2x2 w/ opcyte (pre-op Mohrs)    amiodarone    Tablet 400 milliGRAM(s) Oral every 8 hours  ascorbic acid 500 milliGRAM(s) Oral two times a day  aspirin enteric coated 81 milliGRAM(s) Oral daily  atorvastatin 40 milliGRAM(s) Oral at bedtime  dextrose 50% Injectable 50 milliLiter(s) IV Push every 15 minutes  dextrose 50% Injectable 25 milliLiter(s) IV Push every 15 minutes  docusate sodium 100 milliGRAM(s) Oral three times a day  doxazosin 4 milliGRAM(s) Oral at bedtime  ferrous    sulfate 325 milliGRAM(s) Oral three times a day  folic acid 1 milliGRAM(s) Oral daily  heparin  Injectable 5000 Unit(s) SubCutaneous every 8 hours  insulin lispro (HumaLOG) corrective regimen sliding scale   SubCutaneous Before meals and at bedtime  metoprolol tartrate 50 milliGRAM(s) Oral two times a day  oxyCODONE    5 mG/acetaminophen 325 mG 1 Tablet(s) Oral every 6 hours PRN  oxyCODONE    5 mG/acetaminophen 325 mG 2 Tablet(s) Oral every 6 hours PRN  pantoprazole    Tablet 40 milliGRAM(s) Oral before breakfast  polyethylene glycol 3350 17 Gram(s) Oral daily  potassium chloride  10 mEq/50 mL IVPB 10 milliEquivalent(s) IV Intermittent every 1 hour  potassium chloride  10 mEq/50 mL IVPB 10 milliEquivalent(s) IV Intermittent every 1 hour  sodium chloride 0.9%. 1000 milliLiter(s) IV Continuous <Continuous>  sorbitol 70% Solution 30 milliLiter(s) Oral once      Physical Therapy Rec:   Home  [  ]   Home w/ PT  [  ]  Rehab  [  ]  Discussed with Cardiothoracic Team at AM rounds.

## 2018-12-14 NOTE — DIETITIAN INITIAL EVALUATION ADULT. - NS AS NUTRI INTERV MEALS SNACK
General/healthful diet/Continue low sodium, consistent carbohydrate diet with focus on high protein foods General/healthful diet/Change diet to low sodium with focus on high protein foods

## 2018-12-14 NOTE — DIETITIAN INITIAL EVALUATION ADULT. - NS AS NUTRI INTERV ED CONTENT
Priority modifications/Verbal and written education given to pt and family on heart healthy diet, low sodium foods, modifications of commonly eaten foods, high protein foods, importance of fiber, importance of hydration/Purpose of the nutrition education/Nutrition relationship to health/disease

## 2018-12-14 NOTE — PROGRESS NOTE ADULT - ASSESSMENT
83 y/o  male, former cigarette smoker - remote past < 5PY with PMHx of HTN, HLD, BPH and Basal Cell Carcinoma of the skin presently with lesion on the LLE scheduled for excision, offering c/o exertional dyspnea (possible anginal equivalent) x 2 months.  He was evaluated by his Cardiologist - Dr. Connors and had stress/echo on 11/30/2018 revealing inferolateral wall, apical inferior wall and apical anterior wall that do not augment with exercise.  Patient is now for [facility] for which he presents today.      TTE 9/2018 LVEF 65-70%, Mild MR  CT Surgery consulted for CABG eval   Patient underwent LHC with evidence of possible LM disease and with LAD and LCx vessel disease. Patient is planned for IVUS of left main artery on Monday.    12/11/2018  CABG x 3 LIMA to LAD, SVG to RCA, SVG to Circumflex  nml ef   Pressors inotropes  Acute on chronic kidney dz  IV insulin  Extubated d 0  Bradycardia, on Dobutrex,  Weaned off.   No beta blocker d/t clary  Transferred to sdu  Iron and folate for anemia.  12/13 - HCT remains @ 24 - RBCs ordered- will d/c L Pl ct - ck cxr & transfer to floor - rounds made w/ Dr. Rob

## 2018-12-15 DIAGNOSIS — N17.9 ACUTE KIDNEY FAILURE, UNSPECIFIED: ICD-10-CM

## 2018-12-15 LAB
ALBUMIN SERPL ELPH-MCNC: 2.9 G/DL — LOW (ref 3.3–5)
ALP SERPL-CCNC: 63 U/L — SIGNIFICANT CHANGE UP (ref 40–120)
ALT FLD-CCNC: 23 U/L — SIGNIFICANT CHANGE UP (ref 10–45)
ANION GAP SERPL CALC-SCNC: 13 MMOL/L — SIGNIFICANT CHANGE UP (ref 5–17)
AST SERPL-CCNC: 21 U/L — SIGNIFICANT CHANGE UP (ref 10–40)
BILIRUB SERPL-MCNC: 0.6 MG/DL — SIGNIFICANT CHANGE UP (ref 0.2–1.2)
BUN SERPL-MCNC: 60 MG/DL — HIGH (ref 7–23)
CALCIUM SERPL-MCNC: 8.3 MG/DL — LOW (ref 8.4–10.5)
CHLORIDE SERPL-SCNC: 102 MMOL/L — SIGNIFICANT CHANGE UP (ref 96–108)
CO2 SERPL-SCNC: 21 MMOL/L — LOW (ref 22–31)
CREAT SERPL-MCNC: 2.14 MG/DL — HIGH (ref 0.5–1.3)
GLUCOSE BLDC GLUCOMTR-MCNC: 118 MG/DL — HIGH (ref 70–99)
GLUCOSE BLDC GLUCOMTR-MCNC: 135 MG/DL — HIGH (ref 70–99)
GLUCOSE BLDC GLUCOMTR-MCNC: 147 MG/DL — HIGH (ref 70–99)
GLUCOSE BLDC GLUCOMTR-MCNC: 149 MG/DL — HIGH (ref 70–99)
GLUCOSE BLDC GLUCOMTR-MCNC: 167 MG/DL — HIGH (ref 70–99)
GLUCOSE SERPL-MCNC: 122 MG/DL — HIGH (ref 70–99)
HCT VFR BLD CALC: 25.2 % — LOW (ref 39–50)
HGB BLD-MCNC: 8.7 G/DL — LOW (ref 13–17)
MCHC RBC-ENTMCNC: 30.3 PG — SIGNIFICANT CHANGE UP (ref 27–34)
MCHC RBC-ENTMCNC: 34.6 GM/DL — SIGNIFICANT CHANGE UP (ref 32–36)
MCV RBC AUTO: 87.6 FL — SIGNIFICANT CHANGE UP (ref 80–100)
PLATELET # BLD AUTO: 120 K/UL — LOW (ref 150–400)
POTASSIUM SERPL-MCNC: 4.6 MMOL/L — SIGNIFICANT CHANGE UP (ref 3.5–5.3)
POTASSIUM SERPL-SCNC: 4.6 MMOL/L — SIGNIFICANT CHANGE UP (ref 3.5–5.3)
PROT SERPL-MCNC: 5.5 G/DL — LOW (ref 6–8.3)
RBC # BLD: 2.87 M/UL — LOW (ref 4.2–5.8)
RBC # FLD: 13.9 % — SIGNIFICANT CHANGE UP (ref 10.3–14.5)
SODIUM SERPL-SCNC: 136 MMOL/L — SIGNIFICANT CHANGE UP (ref 135–145)
T3 SERPL-MCNC: 59 NG/DL — LOW (ref 80–200)
T4 AB SER-ACNC: 5.5 UG/DL — SIGNIFICANT CHANGE UP (ref 4.6–12)
TSH SERPL-MCNC: 3.41 UIU/ML — SIGNIFICANT CHANGE UP (ref 0.27–4.2)
WBC # BLD: 6.7 K/UL — SIGNIFICANT CHANGE UP (ref 3.8–10.5)
WBC # FLD AUTO: 6.7 K/UL — SIGNIFICANT CHANGE UP (ref 3.8–10.5)

## 2018-12-15 PROCEDURE — 71045 X-RAY EXAM CHEST 1 VIEW: CPT | Mod: 26

## 2018-12-15 PROCEDURE — 93306 TTE W/DOPPLER COMPLETE: CPT | Mod: 26

## 2018-12-15 RX ORDER — IPRATROPIUM/ALBUTEROL SULFATE 18-103MCG
3 AEROSOL WITH ADAPTER (GRAM) INHALATION EVERY 6 HOURS
Qty: 0 | Refills: 0 | Status: DISCONTINUED | OUTPATIENT
Start: 2018-12-15 | End: 2018-12-17

## 2018-12-15 RX ADMIN — Medication 325 MILLIGRAM(S): at 22:02

## 2018-12-15 RX ADMIN — AMIODARONE HYDROCHLORIDE 200 MILLIGRAM(S): 400 TABLET ORAL at 17:23

## 2018-12-15 RX ADMIN — Medication 3 MILLILITER(S): at 22:03

## 2018-12-15 RX ADMIN — Medication 50 MILLIGRAM(S): at 17:23

## 2018-12-15 RX ADMIN — Medication 4 MILLIGRAM(S): at 22:02

## 2018-12-15 RX ADMIN — Medication 1 MILLIGRAM(S): at 17:22

## 2018-12-15 RX ADMIN — Medication 2: at 22:02

## 2018-12-15 RX ADMIN — Medication 50 MILLIGRAM(S): at 06:15

## 2018-12-15 RX ADMIN — Medication 500 MILLIGRAM(S): at 06:15

## 2018-12-15 RX ADMIN — OXYCODONE AND ACETAMINOPHEN 1 TABLET(S): 5; 325 TABLET ORAL at 17:30

## 2018-12-15 RX ADMIN — Medication 81 MILLIGRAM(S): at 17:23

## 2018-12-15 RX ADMIN — Medication 5: at 09:39

## 2018-12-15 RX ADMIN — OXYCODONE AND ACETAMINOPHEN 1 TABLET(S): 5; 325 TABLET ORAL at 17:52

## 2018-12-15 RX ADMIN — Medication 3 MILLILITER(S): at 12:00

## 2018-12-15 RX ADMIN — HEPARIN SODIUM 5000 UNIT(S): 5000 INJECTION INTRAVENOUS; SUBCUTANEOUS at 06:15

## 2018-12-15 RX ADMIN — Medication 325 MILLIGRAM(S): at 06:15

## 2018-12-15 RX ADMIN — HEPARIN SODIUM 5000 UNIT(S): 5000 INJECTION INTRAVENOUS; SUBCUTANEOUS at 22:02

## 2018-12-15 RX ADMIN — HEPARIN SODIUM 5000 UNIT(S): 5000 INJECTION INTRAVENOUS; SUBCUTANEOUS at 17:21

## 2018-12-15 RX ADMIN — Medication 3 MILLILITER(S): at 17:30

## 2018-12-15 RX ADMIN — AMIODARONE HYDROCHLORIDE 200 MILLIGRAM(S): 400 TABLET ORAL at 06:15

## 2018-12-15 RX ADMIN — Medication 500 MILLIGRAM(S): at 17:23

## 2018-12-15 RX ADMIN — Medication 325 MILLIGRAM(S): at 17:22

## 2018-12-15 RX ADMIN — PANTOPRAZOLE SODIUM 40 MILLIGRAM(S): 20 TABLET, DELAYED RELEASE ORAL at 06:15

## 2018-12-15 RX ADMIN — ATORVASTATIN CALCIUM 40 MILLIGRAM(S): 80 TABLET, FILM COATED ORAL at 22:02

## 2018-12-15 NOTE — PROGRESS NOTE ADULT - PROBLEM SELECTOR PROBLEM 1
Coronary artery disease involving native coronary artery of native heart with angina pectoris
Coronary artery disease involving native coronary artery of native heart with angina pectoris
S/P CABG x 3
Coronary artery disease involving native coronary artery of native heart with angina pectoris
S/P CABG x 3

## 2018-12-15 NOTE — PROGRESS NOTE ADULT - PROVIDER SPECIALTY LIST ADULT
CT Surgery
Cardiology
Critical Care
Hospitalist
CT Surgery

## 2018-12-15 NOTE — PROGRESS NOTE ADULT - SUBJECTIVE AND OBJECTIVE BOX
VITAL SIGNS    Telemetry:    Vital Signs Last 24 Hrs  T(C): 36.4 (12-15-18 @ 05:00), Max: 36.6 (18 @ 14:34)  T(F): 97.6 (12-15-18 @ 05:00), Max: 97.9 (18 @ 14:34)  HR: 70 (12-15-18 @ 05:00) (65 - 73)  BP: 118/64 (12-15-18 @ 05:00) (117/61 - 130/63)  RR: 18 (12-15-18 @ 05:00) (18 - 19)  SpO2: 98% (12-15-18 @ 05:00) (98% - 98%)             @ 07:01  -  12-15 @ 07:00  --------------------------------------------------------  IN: 680 mL / OUT: 500 mL / NET: 180 mL    12-15 @ 07:01  -  12-15 @ 11:35  --------------------------------------------------------  IN: 120 mL / OUT: 0 mL / NET: 120 mL       Daily     Daily Weight in k.5 (15 Dec 2018 07:20)  Admit Wt: Drug Dosing Weight  Height (cm): 167.64 (11 Dec 2018 10:24)  Weight (kg): 81.2 (11 Dec 2018 10:24)  BMI (kg/m2): 28.9 (11 Dec 2018 10:24)  BSA (m2): 1.91 (11 Dec 2018 10:24)    Bilirubin Total, Serum: 0.6 mg/dL (12-15 @ 06:51)    CAPILLARY BLOOD GLUCOSE      POCT Blood Glucose.: 167 mg/dL (15 Dec 2018 09:34)  POCT Blood Glucose.: 135 mg/dL (15 Dec 2018 07:46)  POCT Blood Glucose.: 143 mg/dL (14 Dec 2018 21:48)  POCT Blood Glucose.: 148 mg/dL (14 Dec 2018 16:22)  POCT Blood Glucose.: 132 mg/dL (14 Dec 2018 12:11)          ALBUTerol/ipratropium for Nebulization 3 milliLiter(s) Nebulizer every 6 hours  amiodarone    Tablet 200 milliGRAM(s) Oral two times a day  ascorbic acid 500 milliGRAM(s) Oral two times a day  aspirin enteric coated 81 milliGRAM(s) Oral daily  atorvastatin 40 milliGRAM(s) Oral at bedtime  dextrose 50% Injectable 50 milliLiter(s) IV Push every 15 minutes  dextrose 50% Injectable 25 milliLiter(s) IV Push every 15 minutes  docusate sodium 100 milliGRAM(s) Oral three times a day  doxazosin 4 milliGRAM(s) Oral at bedtime  ferrous    sulfate 325 milliGRAM(s) Oral three times a day  folic acid 1 milliGRAM(s) Oral daily  heparin  Injectable 5000 Unit(s) SubCutaneous every 8 hours  insulin lispro (HumaLOG) corrective regimen sliding scale   SubCutaneous Before meals and at bedtime  metoprolol tartrate 50 milliGRAM(s) Oral two times a day  oxyCODONE    5 mG/acetaminophen 325 mG 1 Tablet(s) Oral every 6 hours PRN  oxyCODONE    5 mG/acetaminophen 325 mG 2 Tablet(s) Oral every 6 hours PRN  pantoprazole    Tablet 40 milliGRAM(s) Oral before breakfast  polyethylene glycol 3350 17 Gram(s) Oral daily  sodium chloride 0.9%. 1000 milliLiter(s) IV Continuous <Continuous>      PHYSICAL EXAM    Subjective: "Hi.   Neurology: alert and oriented x 3, nonfocal, no gross deficits  CV : tele:  RSR  Sternal Wound :  CDI with dressing , Stable  Lungs: clear. RR easy, unlabored   Abdomen: soft, nontender, nondistended, positive bowel sounds, bowel movement   Neg N/V/D   :  pt voiding without difficulty   Extremities:   DOBBS; edema, neg calf tenderness.   PPP bilaterally      PW:  Chest tubes: VITAL SIGNS    Telemetry:  RSR 50-70   Vital Signs Last 24 Hrs  T(C): 36.4 (12-15-18 @ 05:00), Max: 36.6 (18 @ 14:34)  T(F): 97.6 (12-15-18 @ 05:00), Max: 97.9 (18 @ 14:34)  HR: 70 (12-15-18 @ 05:00) (65 - 73)  BP: 118/64 (12-15-18 @ 05:00) (117/61 - 130/63)  RR: 18 (12-15-18 @ 05:00) (18 - 19)  SpO2: 98% (12-15-18 @ 05:00) (98% - 98%)             @ 07:01  -  12-15 @ 07:00  --------------------------------------------------------  IN: 680 mL / OUT: 500 mL / NET: 180 mL    12-15 @ 07:01  -  12-15 @ 11:35  --------------------------------------------------------  IN: 120 mL / OUT: 0 mL / NET: 120 mL       Daily     Daily Weight in k.5 (15 Dec 2018 07:20)  Admit Wt: Drug Dosing Weight  Height (cm): 167.64 (11 Dec 2018 10:24)  Weight (kg): 81.2 (11 Dec 2018 10:24)  BMI (kg/m2): 28.9 (11 Dec 2018 10:24)  BSA (m2): 1.91 (11 Dec 2018 10:24)    Bilirubin Total, Serum: 0.6 mg/dL (12-15 @ 06:51)    CAPILLARY BLOOD GLUCOSE      POCT Blood Glucose.: 167 mg/dL (15 Dec 2018 09:34)  POCT Blood Glucose.: 135 mg/dL (15 Dec 2018 07:46)  POCT Blood Glucose.: 143 mg/dL (14 Dec 2018 21:48)  POCT Blood Glucose.: 148 mg/dL (14 Dec 2018 16:22)  POCT Blood Glucose.: 132 mg/dL (14 Dec 2018 12:11)          ALBUTerol/ipratropium for Nebulization 3 milliLiter(s) Nebulizer every 6 hours  amiodarone    Tablet 200 milliGRAM(s) Oral two times a day  ascorbic acid 500 milliGRAM(s) Oral two times a day  aspirin enteric coated 81 milliGRAM(s) Oral daily  atorvastatin 40 milliGRAM(s) Oral at bedtime  dextrose 50% Injectable 50 milliLiter(s) IV Push every 15 minutes  dextrose 50% Injectable 25 milliLiter(s) IV Push every 15 minutes  docusate sodium 100 milliGRAM(s) Oral three times a day  doxazosin 4 milliGRAM(s) Oral at bedtime  ferrous    sulfate 325 milliGRAM(s) Oral three times a day  folic acid 1 milliGRAM(s) Oral daily  heparin  Injectable 5000 Unit(s) SubCutaneous every 8 hours  insulin lispro (HumaLOG) corrective regimen sliding scale   SubCutaneous Before meals and at bedtime  metoprolol tartrate 50 milliGRAM(s) Oral two times a day  oxyCODONE    5 mG/acetaminophen 325 mG 1 Tablet(s) Oral every 6 hours PRN  oxyCODONE    5 mG/acetaminophen 325 mG 2 Tablet(s) Oral every 6 hours PRN  pantoprazole    Tablet 40 milliGRAM(s) Oral before breakfast  polyethylene glycol 3350 17 Gram(s) Oral daily  sodium chloride 0.9%. 1000 milliLiter(s) IV Continuous <Continuous>      PHYSICAL EXAM    Subjective: "I feel ok."   Neurology: alert and oriented x 3, nonfocal, no gross deficits  CV : tele:  RSR 50-70   Sternal Wound :  CDI with dressing , Stable; +pw -vvi 45 MA 12   Lungs: clear but diminished at the bases. RR easy, unlabored   Abdomen: soft, nontender, nondistended, positive bowel sounds, + bowel movement; Neg N/V/D   :  pt voiding without difficulty   Extremities:   DOBBS; trace Le edema, neg calf tenderness.   PPP bilaterally; right SVG site cdi w/ AB      PW: + vvi 45 ma 12   Chest tubes: none

## 2018-12-15 NOTE — PROGRESS NOTE ADULT - PROBLEM SELECTOR PROBLEM 2
Afib
Afib
Anemia due to blood loss
Essential hypertension

## 2018-12-15 NOTE — PROGRESS NOTE ADULT - ASSESSMENT
85 y/o  male, former cigarette smoker - remote past < 5PY with PMHx of HTN, HLD, BPH and Basal Cell Carcinoma of the skin presently with lesion on the LLE scheduled for excision, offering c/o exertional dyspnea (possible anginal equivalent) x 2 months.  He was evaluated by his Cardiologist - Dr. Connors and had stress/echo on 11/30/2018 revealing inferolateral wall, apical inferior wall and apical anterior wall that do not augment with exercise.  Patient is now for [facility] for which he presents today.      TTE 9/2018 LVEF 65-70%, Mild MR  CT Surgery consulted for CABG eval   Patient underwent LHC with evidence of possible LM disease and with LAD and LCx vessel disease. Patient is planned for IVUS of left main artery on Monday.    12/11/2018  CABG x 3 LIMA to LAD, SVG to RCA, SVG to Circumflex  nml ef   Pressors inotropes  Acute on chronic kidney dz  IV insulin  Extubated d 0  Bradycardia, on Dobutrex,  Weaned off.   No beta blocker d/t clary  Transferred to sdu  Iron and folate for anemia.  12/13 - HCT remains @ 24 - RBCs ordered- will d/c L Pl ct - ck cxr & transfer to floor - rounds made w/ Dr. Rob 85 y/o  male, former cigarette smoker - remote past < 5PY with PMHx of HTN, HLD, BPH and Basal Cell Carcinoma of the skin presently with lesion on the LLE scheduled for excision, offering c/o exertional dyspnea (possible anginal equivalent) x 2 months.  He was evaluated by his Cardiologist - Dr. Connors and had stress/echo on 11/30/2018 revealing inferolateral wall, apical inferior wall and apical anterior wall that do not augment with exercise.  Patient is now for [facility] for which he presents today.      TTE 9/2018 LVEF 65-70%, Mild MR  CT Surgery consulted for CABG eval   Patient underwent LHC with evidence of possible LM disease and with LAD and LCx vessel disease. Patient is planned for IVUS of left main artery on Monday.    12/11/2018  CABG x 3 LIMA to LAD, SVG to RCA, SVG to Circumflex  nml ef   Pressors inotropes  Acute on chronic kidney dz  IV insulin  Extubated d 0  Bradycardia, on Dobutrex,  Weaned off.   No beta blocker d/t clary  Transferred to sdu  Iron and folate for anemia.  12/13 - HCT remains @ 24 - RBCs ordered- will d/c L Pl ct - ck cxr & transfer to floor - rounds made w/ Dr. Rob  12/15 VSS; continue amio load and b-blockers; cr increased to 2.1- bladder scan < 200 cc; ck Echo as per DR. Rob  Discharge planning- home sun/ mon

## 2018-12-15 NOTE — PROGRESS NOTE ADULT - PROBLEM SELECTOR PLAN 3
iron, vitamin c, folate  RBcs ordered  will ck post transfusion HCT iron, vitamin c, folate  cbc daily

## 2018-12-15 NOTE — PROGRESS NOTE ADULT - PROBLEM SELECTOR PLAN 1
Asa, Statin, Chest PT,  Incentive spirometry, wound care and assessment.  Ambulate   Shower pod #5 continue postop care  continue asa/ statin/ b-blockers/ amio load  dvt prophylaxis  pain management  pulm toilet  +pw- VVi 45 MA 12  ck Echo today   increase activity as tolerated  Discharge planning- Home when cr stable

## 2018-12-15 NOTE — PROGRESS NOTE ADULT - PROBLEM SELECTOR PLAN 2
lopressor 50mg po bid  Amio load 12/13  SR lopressor 50mg po bid  Amio 200 bid   pt currently RSR   +pw- vvi 45 MA 10

## 2018-12-16 ENCOUNTER — TRANSCRIPTION ENCOUNTER (OUTPATIENT)
Age: 83
End: 2018-12-16

## 2018-12-16 LAB
ANION GAP SERPL CALC-SCNC: 13 MMOL/L — SIGNIFICANT CHANGE UP (ref 5–17)
BUN SERPL-MCNC: 54 MG/DL — HIGH (ref 7–23)
CALCIUM SERPL-MCNC: 8.3 MG/DL — LOW (ref 8.4–10.5)
CHLORIDE SERPL-SCNC: 103 MMOL/L — SIGNIFICANT CHANGE UP (ref 96–108)
CO2 SERPL-SCNC: 22 MMOL/L — SIGNIFICANT CHANGE UP (ref 22–31)
CREAT SERPL-MCNC: 1.92 MG/DL — HIGH (ref 0.5–1.3)
GLUCOSE BLDC GLUCOMTR-MCNC: 133 MG/DL — HIGH (ref 70–99)
GLUCOSE BLDC GLUCOMTR-MCNC: 136 MG/DL — HIGH (ref 70–99)
GLUCOSE BLDC GLUCOMTR-MCNC: 159 MG/DL — HIGH (ref 70–99)
GLUCOSE BLDC GLUCOMTR-MCNC: 188 MG/DL — HIGH (ref 70–99)
GLUCOSE SERPL-MCNC: 126 MG/DL — HIGH (ref 70–99)
HCT VFR BLD CALC: 26.5 % — LOW (ref 39–50)
HGB BLD-MCNC: 8.6 G/DL — LOW (ref 13–17)
MCHC RBC-ENTMCNC: 28.5 PG — SIGNIFICANT CHANGE UP (ref 27–34)
MCHC RBC-ENTMCNC: 32.5 GM/DL — SIGNIFICANT CHANGE UP (ref 32–36)
MCV RBC AUTO: 87.6 FL — SIGNIFICANT CHANGE UP (ref 80–100)
PLATELET # BLD AUTO: 160 K/UL — SIGNIFICANT CHANGE UP (ref 150–400)
POTASSIUM SERPL-MCNC: 4.5 MMOL/L — SIGNIFICANT CHANGE UP (ref 3.5–5.3)
POTASSIUM SERPL-SCNC: 4.5 MMOL/L — SIGNIFICANT CHANGE UP (ref 3.5–5.3)
RBC # BLD: 3.02 M/UL — LOW (ref 4.2–5.8)
RBC # FLD: 13.2 % — SIGNIFICANT CHANGE UP (ref 10.3–14.5)
SODIUM SERPL-SCNC: 138 MMOL/L — SIGNIFICANT CHANGE UP (ref 135–145)
WBC # BLD: 5.7 K/UL — SIGNIFICANT CHANGE UP (ref 3.8–10.5)
WBC # FLD AUTO: 5.7 K/UL — SIGNIFICANT CHANGE UP (ref 3.8–10.5)

## 2018-12-16 PROCEDURE — 93010 ELECTROCARDIOGRAM REPORT: CPT

## 2018-12-16 RX ADMIN — Medication 5: at 17:28

## 2018-12-16 RX ADMIN — HEPARIN SODIUM 5000 UNIT(S): 5000 INJECTION INTRAVENOUS; SUBCUTANEOUS at 06:24

## 2018-12-16 RX ADMIN — OXYCODONE AND ACETAMINOPHEN 1 TABLET(S): 5; 325 TABLET ORAL at 20:19

## 2018-12-16 RX ADMIN — Medication 325 MILLIGRAM(S): at 06:24

## 2018-12-16 RX ADMIN — Medication 100 MILLIGRAM(S): at 16:03

## 2018-12-16 RX ADMIN — HEPARIN SODIUM 5000 UNIT(S): 5000 INJECTION INTRAVENOUS; SUBCUTANEOUS at 16:03

## 2018-12-16 RX ADMIN — Medication 81 MILLIGRAM(S): at 12:10

## 2018-12-16 RX ADMIN — ATORVASTATIN CALCIUM 40 MILLIGRAM(S): 80 TABLET, FILM COATED ORAL at 21:07

## 2018-12-16 RX ADMIN — Medication 2: at 08:09

## 2018-12-16 RX ADMIN — Medication 3 MILLILITER(S): at 12:12

## 2018-12-16 RX ADMIN — Medication 500 MILLIGRAM(S): at 17:28

## 2018-12-16 RX ADMIN — OXYCODONE AND ACETAMINOPHEN 1 TABLET(S): 5; 325 TABLET ORAL at 19:49

## 2018-12-16 RX ADMIN — Medication 8: at 22:28

## 2018-12-16 RX ADMIN — OXYCODONE AND ACETAMINOPHEN 1 TABLET(S): 5; 325 TABLET ORAL at 12:42

## 2018-12-16 RX ADMIN — Medication 4 MILLIGRAM(S): at 21:07

## 2018-12-16 RX ADMIN — Medication 50 MILLIGRAM(S): at 06:24

## 2018-12-16 RX ADMIN — OXYCODONE AND ACETAMINOPHEN 1 TABLET(S): 5; 325 TABLET ORAL at 12:12

## 2018-12-16 RX ADMIN — AMIODARONE HYDROCHLORIDE 200 MILLIGRAM(S): 400 TABLET ORAL at 17:28

## 2018-12-16 RX ADMIN — Medication 50 MILLIGRAM(S): at 17:28

## 2018-12-16 RX ADMIN — Medication 2: at 12:09

## 2018-12-16 RX ADMIN — Medication 325 MILLIGRAM(S): at 21:07

## 2018-12-16 RX ADMIN — Medication 3 MILLILITER(S): at 06:24

## 2018-12-16 RX ADMIN — PANTOPRAZOLE SODIUM 40 MILLIGRAM(S): 20 TABLET, DELAYED RELEASE ORAL at 06:25

## 2018-12-16 RX ADMIN — AMIODARONE HYDROCHLORIDE 200 MILLIGRAM(S): 400 TABLET ORAL at 06:24

## 2018-12-16 RX ADMIN — Medication 1 MILLIGRAM(S): at 12:10

## 2018-12-16 RX ADMIN — Medication 100 MILLIGRAM(S): at 21:07

## 2018-12-16 RX ADMIN — HEPARIN SODIUM 5000 UNIT(S): 5000 INJECTION INTRAVENOUS; SUBCUTANEOUS at 21:07

## 2018-12-16 RX ADMIN — Medication 3 MILLILITER(S): at 17:28

## 2018-12-16 RX ADMIN — Medication 3 MILLILITER(S): at 22:47

## 2018-12-16 RX ADMIN — Medication 500 MILLIGRAM(S): at 06:24

## 2018-12-16 RX ADMIN — Medication 325 MILLIGRAM(S): at 16:03

## 2018-12-16 NOTE — DISCHARGE NOTE ADULT - PLAN OF CARE
complete recovery shower daily  weigh yourself daily  refer to cardiac surgery do's and don't handout  continue current prescriptions as ordered  increase activity as tolerated   no added salt; low fat; low cholesterol, low salt diet.   follow up with Cardiologist in 1-2 weeks. Call to schedule appointment.  follow up with cardiac surgeon, Dr. Rob on December 31st at 10:00 am. Call to confirm appointment. [phone]

## 2018-12-16 NOTE — DISCHARGE NOTE ADULT - OTHER SIGNIFICANT FINDINGS
VSS  tele: RSR 50-80  midsternal incision cdi yao; sternum stable  lungs clear, RR easy unlabored 98% RA  +bs nt nd + bm; obese abdomen; neg n/v/d  LE: trace LE edema bilaterally, RT svg site cdi yao, neg calf tenderness. PPP bilaterally    discharge pt home today as per CTS rounds; Dr. Mcintyre

## 2018-12-16 NOTE — DISCHARGE NOTE ADULT - PATIENT PORTAL LINK FT
You can access the [facility] Patient Portal, offered by [facility], by registering with the following website: [email or web address]

## 2018-12-16 NOTE — DISCHARGE NOTE ADULT - CARE PLAN
Principal Discharge DX:	S/P CABG x 3  Goal:	complete recovery  Assessment and plan of treatment:	shower daily  weigh yourself daily  refer to cardiac surgery do's and don't handout  continue current prescriptions as ordered  increase activity as tolerated   no added salt; low fat; low cholesterol, low salt diet.   follow up with Cardiologist in 1-2 weeks. Call to schedule appointment.  follow up with cardiac surgeon, Dr. Rob on December 31st at 10:00 am. Call to confirm appointment. [phone]

## 2018-12-16 NOTE — DISCHARGE NOTE ADULT - MEDICATION SUMMARY - MEDICATIONS TO TAKE
I will START or STAY ON the medications listed below when I get home from the hospital:    aspirin 81 mg oral delayed release tablet  -- 1 tab(s) by mouth once a day  -- Indication: For Blood thinner    oxycodone-acetaminophen 5 mg-325 mg oral tablet  -- 1 tab(s) by mouth every 6 hours, As needed, Moderate Pain (4 - 6) MDD:4  -- Indication: For Pain    doxazosin 4 mg oral tablet  -- 1 tab(s) by mouth once a day (at bedtime)  -- Indication: For Prostate enlargement    amiodarone 200 mg oral tablet  -- 1 tab(s) by mouth once a day   -- Indication: For Anti-arrythmic    atorvastatin 40 mg oral tablet  -- 1 tab(s) by mouth once a day (at bedtime)  -- Indication: For Cholesterol    metoprolol tartrate 50 mg oral tablet  -- 1 tab(s) by mouth 2 times a day  -- Indication: For Anti-arrythmic    hydroCHLOROthiazide 25 mg oral tablet  -- 1 tab(s) by mouth once a day  -- Indication: For diuretic    ferrous sulfate 325 mg (65 mg elemental iron) oral tablet  -- 1 tab(s) by mouth 3 times a day   -- Check with your doctor before becoming pregnant.  Do not chew, break, or crush.  May discolor urine or feces.    -- Indication: For Anemia       docusate sodium 100 mg oral capsule  -- 1 cap(s) by mouth 3 times a day  -- Indication: For Stool softener     polyethylene glycol 3350 oral powder for reconstitution  -- 17 gram(s) by mouth once a day  -- Indication: For Constipation    pantoprazole 40 mg oral delayed release tablet  -- 1 tab(s) by mouth once a day (before a meal)  -- Indication: For Antacid    ascorbic acid 500 mg oral tablet  -- 1 tab(s) by mouth 2 times a day  -- Indication: For Anemia       folic acid 1 mg oral tablet  -- 1 tab(s) by mouth once a day  -- Indication: For Anemia

## 2018-12-16 NOTE — DISCHARGE NOTE ADULT - HOSPITAL COURSE
83 y/o  male, former cigarette smoker - remote past < 5PY with PMHx of HTN, HLD, BPH and Basal Cell Carcinoma of the skin presently with lesion on the LLE scheduled for excision, offering c/o exertional dyspnea (possible anginal equivalent) x 2 months.  He was evaluated by his Cardiologist - Dr. Connors and had stress/echo on 11/30/2018 revealing inferolateral wall, apical inferior wall and apical anterior wall that do not augment with exercise.  Patient is now for [facility] for which he presents today.      TTE 9/2018 LVEF 65-70%, Mild MR  CT Surgery consulted for CABG eval   Patient underwent LHC with evidence of possible LM disease and with LAD and LCx vessel disease. Patient is planned for IVUS of left main artery on Monday.    12/11/2018  CABG x 3 LIMA to LAD, SVG to RCA, SVG to Circumflex  nml ef   Pressors inotropes  Acute on chronic kidney dz  IV insulin  Extubated d 0  Bradycardia, on Dobutrex,  Weaned off.   No beta blocker d/t clary  Transferred to sdu  Iron and folate for anemia.  12/13 - HCT remains @ 24 - RBCs ordered- will d/c L Pl ct - ck cxr & transfer to floor - rounds made w/ Dr. Rob  12/15 VSS; continue amio load and b-blockers; cr increased to 2.1- bladder scan < 200 cc; ck Echo : neg pericardial effusion  12/16 VSS; pt stable   12/17 d/w pw/ cr down to 1.7- pt to see patient- patient cleared for discharge home   Discharge planning- home today 12/17 as per Dr. yoon

## 2018-12-16 NOTE — DISCHARGE NOTE ADULT - ADDITIONAL INSTRUCTIONS
follow up with Cardiologist in 1-2 weeks. Call to schedule appointment.  follow up with cardiac surgeon, Dr. Rob on December 31st at 10:00 am. Call to confirm appointment. [phone]

## 2018-12-16 NOTE — CHART NOTE - NSCHARTNOTEFT_GEN_A_CORE
Pt seen for request to speak with RD re: supplements, nutrition education review. Pt still c/o poor appetite, requests supplements, amenable to try Glucerna x 2 x daily chocolate and strawberry flavor.    Pt's chart and labs reviewed. Pt is post-op CABG x 3 12/11/18.    Reviewed post-op CABG and heart healthy diet education with pt, discussed low sodium recommendations and suitable alternatives for pt's favorite high sodium foods, adequate protein and energy to promote post-op wound healing, general heart healthy recommendations including lean protein intake, adequate intake of non-starchy vegetables, indication and recommended duration of oral supplement use. Pt voiced understanding.    Discussed with NP to add Glucerna x 2 daily (440 kcal, 22 grams protein)    Pt made aware RD remains available as needed: Rahel Olson MS, RDN, CDN, CDE. #[phone]

## 2018-12-17 VITALS
RESPIRATION RATE: 18 BRPM | HEART RATE: 74 BPM | SYSTOLIC BLOOD PRESSURE: 149 MMHG | DIASTOLIC BLOOD PRESSURE: 67 MMHG | OXYGEN SATURATION: 98 % | TEMPERATURE: 98 F

## 2018-12-17 LAB
ANION GAP SERPL CALC-SCNC: 13 MMOL/L — SIGNIFICANT CHANGE UP (ref 5–17)
BUN SERPL-MCNC: 48 MG/DL — HIGH (ref 7–23)
CALCIUM SERPL-MCNC: 8.3 MG/DL — LOW (ref 8.4–10.5)
CHLORIDE SERPL-SCNC: 102 MMOL/L — SIGNIFICANT CHANGE UP (ref 96–108)
CO2 SERPL-SCNC: 22 MMOL/L — SIGNIFICANT CHANGE UP (ref 22–31)
CREAT SERPL-MCNC: 1.7 MG/DL — HIGH (ref 0.5–1.3)
GLUCOSE BLDC GLUCOMTR-MCNC: 123 MG/DL — HIGH (ref 70–99)
GLUCOSE BLDC GLUCOMTR-MCNC: 139 MG/DL — HIGH (ref 70–99)
GLUCOSE SERPL-MCNC: 123 MG/DL — HIGH (ref 70–99)
HCT VFR BLD CALC: 26.3 % — LOW (ref 39–50)
HGB BLD-MCNC: 9 G/DL — LOW (ref 13–17)
MCHC RBC-ENTMCNC: 30.2 PG — SIGNIFICANT CHANGE UP (ref 27–34)
MCHC RBC-ENTMCNC: 34.4 GM/DL — SIGNIFICANT CHANGE UP (ref 32–36)
MCV RBC AUTO: 88 FL — SIGNIFICANT CHANGE UP (ref 80–100)
PLATELET # BLD AUTO: 188 K/UL — SIGNIFICANT CHANGE UP (ref 150–400)
POTASSIUM SERPL-MCNC: 4.3 MMOL/L — SIGNIFICANT CHANGE UP (ref 3.5–5.3)
POTASSIUM SERPL-SCNC: 4.3 MMOL/L — SIGNIFICANT CHANGE UP (ref 3.5–5.3)
RBC # BLD: 2.99 M/UL — LOW (ref 4.2–5.8)
RBC # FLD: 13.6 % — SIGNIFICANT CHANGE UP (ref 10.3–14.5)
SODIUM SERPL-SCNC: 137 MMOL/L — SIGNIFICANT CHANGE UP (ref 135–145)
WBC # BLD: 6.7 K/UL — SIGNIFICANT CHANGE UP (ref 3.8–10.5)
WBC # FLD AUTO: 6.7 K/UL — SIGNIFICANT CHANGE UP (ref 3.8–10.5)

## 2018-12-17 PROCEDURE — 86850 RBC ANTIBODY SCREEN: CPT

## 2018-12-17 PROCEDURE — 92978 ENDOLUMINL IVUS OCT C 1ST: CPT | Mod: LM

## 2018-12-17 PROCEDURE — 93880 EXTRACRANIAL BILAT STUDY: CPT

## 2018-12-17 PROCEDURE — C1769: CPT

## 2018-12-17 PROCEDURE — 80048 BASIC METABOLIC PNL TOTAL CA: CPT

## 2018-12-17 PROCEDURE — 97162 PT EVAL MOD COMPLEX 30 MIN: CPT

## 2018-12-17 PROCEDURE — 36430 TRANSFUSION BLD/BLD COMPNT: CPT

## 2018-12-17 PROCEDURE — 81003 URINALYSIS AUTO W/O SCOPE: CPT

## 2018-12-17 PROCEDURE — 94010 BREATHING CAPACITY TEST: CPT

## 2018-12-17 PROCEDURE — 83735 ASSAY OF MAGNESIUM: CPT

## 2018-12-17 PROCEDURE — 82550 ASSAY OF CK (CPK): CPT

## 2018-12-17 PROCEDURE — 93005 ELECTROCARDIOGRAM TRACING: CPT

## 2018-12-17 PROCEDURE — 84484 ASSAY OF TROPONIN QUANT: CPT

## 2018-12-17 PROCEDURE — 82435 ASSAY OF BLOOD CHLORIDE: CPT

## 2018-12-17 PROCEDURE — 93306 TTE W/DOPPLER COMPLETE: CPT

## 2018-12-17 PROCEDURE — P9045: CPT

## 2018-12-17 PROCEDURE — 80053 COMPREHEN METABOLIC PANEL: CPT

## 2018-12-17 PROCEDURE — 84480 ASSAY TRIIODOTHYRONINE (T3): CPT

## 2018-12-17 PROCEDURE — P9047: CPT

## 2018-12-17 PROCEDURE — 94002 VENT MGMT INPAT INIT DAY: CPT

## 2018-12-17 PROCEDURE — 82553 CREATINE MB FRACTION: CPT

## 2018-12-17 PROCEDURE — 76937 US GUIDE VASCULAR ACCESS: CPT

## 2018-12-17 PROCEDURE — 86923 COMPATIBILITY TEST ELECTRIC: CPT

## 2018-12-17 PROCEDURE — 85730 THROMBOPLASTIN TIME PARTIAL: CPT

## 2018-12-17 PROCEDURE — 84436 ASSAY OF TOTAL THYROXINE: CPT

## 2018-12-17 PROCEDURE — 71045 X-RAY EXAM CHEST 1 VIEW: CPT

## 2018-12-17 PROCEDURE — 83880 ASSAY OF NATRIURETIC PEPTIDE: CPT

## 2018-12-17 PROCEDURE — 99238 HOSP IP/OBS DSCHRG MGMT 30/<: CPT | Mod: 24

## 2018-12-17 PROCEDURE — 94640 AIRWAY INHALATION TREATMENT: CPT

## 2018-12-17 PROCEDURE — 97110 THERAPEUTIC EXERCISES: CPT

## 2018-12-17 PROCEDURE — C1887: CPT

## 2018-12-17 PROCEDURE — 85027 COMPLETE CBC AUTOMATED: CPT

## 2018-12-17 PROCEDURE — 99152 MOD SED SAME PHYS/QHP 5/>YRS: CPT

## 2018-12-17 PROCEDURE — C1894: CPT

## 2018-12-17 PROCEDURE — 86900 BLOOD TYPING SEROLOGIC ABO: CPT

## 2018-12-17 PROCEDURE — 84100 ASSAY OF PHOSPHORUS: CPT

## 2018-12-17 PROCEDURE — 84132 ASSAY OF SERUM POTASSIUM: CPT

## 2018-12-17 PROCEDURE — 82330 ASSAY OF CALCIUM: CPT

## 2018-12-17 PROCEDURE — P9016: CPT

## 2018-12-17 PROCEDURE — 82962 GLUCOSE BLOOD TEST: CPT

## 2018-12-17 PROCEDURE — 31720 CLEARANCE OF AIRWAYS: CPT

## 2018-12-17 PROCEDURE — 86891 AUTOLOGOUS BLOOD OP SALVAGE: CPT

## 2018-12-17 PROCEDURE — 82803 BLOOD GASES ANY COMBINATION: CPT

## 2018-12-17 PROCEDURE — 84443 ASSAY THYROID STIM HORMONE: CPT

## 2018-12-17 PROCEDURE — 87640 STAPH A DNA AMP PROBE: CPT

## 2018-12-17 PROCEDURE — 97116 GAIT TRAINING THERAPY: CPT

## 2018-12-17 PROCEDURE — C1753: CPT

## 2018-12-17 PROCEDURE — 86901 BLOOD TYPING SEROLOGIC RH(D): CPT

## 2018-12-17 PROCEDURE — 87641 MR-STAPH DNA AMP PROBE: CPT

## 2018-12-17 PROCEDURE — 99153 MOD SED SAME PHYS/QHP EA: CPT

## 2018-12-17 PROCEDURE — 82947 ASSAY GLUCOSE BLOOD QUANT: CPT

## 2018-12-17 PROCEDURE — P9041: CPT

## 2018-12-17 PROCEDURE — 83036 HEMOGLOBIN GLYCOSYLATED A1C: CPT

## 2018-12-17 PROCEDURE — 93458 L HRT ARTERY/VENTRICLE ANGIO: CPT

## 2018-12-17 PROCEDURE — 85610 PROTHROMBIN TIME: CPT

## 2018-12-17 PROCEDURE — C1889: CPT

## 2018-12-17 PROCEDURE — 83605 ASSAY OF LACTIC ACID: CPT

## 2018-12-17 PROCEDURE — C1751: CPT

## 2018-12-17 PROCEDURE — 84295 ASSAY OF SERUM SODIUM: CPT

## 2018-12-17 PROCEDURE — 85384 FIBRINOGEN ACTIVITY: CPT

## 2018-12-17 PROCEDURE — 85014 HEMATOCRIT: CPT

## 2018-12-17 PROCEDURE — 93010 ELECTROCARDIOGRAM REPORT: CPT

## 2018-12-17 RX ORDER — ASPIRIN/CALCIUM CARB/MAGNESIUM 324 MG
1 TABLET ORAL
Qty: 0 | Refills: 0 | COMMUNITY

## 2018-12-17 RX ORDER — FELODIPINE 5 MG/1
1 TABLET, FILM COATED, EXTENDED RELEASE ORAL
Qty: 0 | Refills: 0 | COMMUNITY

## 2018-12-17 RX ORDER — FERROUS SULFATE 325(65) MG
1 TABLET ORAL
Qty: 90 | Refills: 0 | OUTPATIENT
Start: 2018-12-17 | End: 2019-01-15

## 2018-12-17 RX ORDER — POLYETHYLENE GLYCOL 3350 17 G/17G
17 POWDER, FOR SOLUTION ORAL
Qty: 0 | Refills: 0 | COMMUNITY
Start: 2018-12-17

## 2018-12-17 RX ORDER — DOXAZOSIN MESYLATE 4 MG
1 TABLET ORAL
Qty: 0 | Refills: 0 | COMMUNITY

## 2018-12-17 RX ORDER — AMIODARONE HYDROCHLORIDE 400 MG/1
1 TABLET ORAL
Qty: 30 | Refills: 0 | OUTPATIENT
Start: 2018-12-17 | End: 2019-01-15

## 2018-12-17 RX ORDER — DOXAZOSIN MESYLATE 4 MG
1 TABLET ORAL
Qty: 30 | Refills: 0 | OUTPATIENT
Start: 2018-12-17 | End: 2019-01-15

## 2018-12-17 RX ORDER — FOLIC ACID 0.8 MG
1 TABLET ORAL
Qty: 30 | Refills: 0 | OUTPATIENT
Start: 2018-12-17 | End: 2019-01-15

## 2018-12-17 RX ORDER — METOPROLOL TARTRATE 50 MG
1 TABLET ORAL
Qty: 0 | Refills: 0 | COMMUNITY

## 2018-12-17 RX ORDER — DOCUSATE SODIUM 100 MG
1 CAPSULE ORAL
Qty: 30 | Refills: 0 | OUTPATIENT
Start: 2018-12-17 | End: 2018-12-26

## 2018-12-17 RX ORDER — ASPIRIN/CALCIUM CARB/MAGNESIUM 324 MG
1 TABLET ORAL
Qty: 30 | Refills: 0 | OUTPATIENT
Start: 2018-12-17 | End: 2019-01-15

## 2018-12-17 RX ORDER — PANTOPRAZOLE SODIUM 20 MG/1
1 TABLET, DELAYED RELEASE ORAL
Qty: 10 | Refills: 0 | OUTPATIENT
Start: 2018-12-17 | End: 2018-12-26

## 2018-12-17 RX ORDER — METOPROLOL TARTRATE 50 MG
1 TABLET ORAL
Qty: 60 | Refills: 0 | OUTPATIENT
Start: 2018-12-17 | End: 2019-01-15

## 2018-12-17 RX ORDER — ASCORBIC ACID 60 MG
1 TABLET,CHEWABLE ORAL
Qty: 20 | Refills: 0 | OUTPATIENT
Start: 2018-12-17 | End: 2018-12-26

## 2018-12-17 RX ORDER — ATORVASTATIN CALCIUM 80 MG/1
1 TABLET, FILM COATED ORAL
Qty: 0 | Refills: 0 | COMMUNITY

## 2018-12-17 RX ORDER — ATORVASTATIN CALCIUM 80 MG/1
1 TABLET, FILM COATED ORAL
Qty: 30 | Refills: 0 | OUTPATIENT
Start: 2018-12-17 | End: 2019-01-15

## 2018-12-17 RX ADMIN — Medication 81 MILLIGRAM(S): at 11:39

## 2018-12-17 RX ADMIN — Medication 1 MILLIGRAM(S): at 11:38

## 2018-12-17 RX ADMIN — Medication 50 MILLIGRAM(S): at 07:04

## 2018-12-17 RX ADMIN — Medication 3 MILLILITER(S): at 11:40

## 2018-12-17 RX ADMIN — Medication 2: at 08:08

## 2018-12-17 RX ADMIN — Medication 325 MILLIGRAM(S): at 13:09

## 2018-12-17 RX ADMIN — Medication 2: at 12:19

## 2018-12-17 RX ADMIN — HEPARIN SODIUM 5000 UNIT(S): 5000 INJECTION INTRAVENOUS; SUBCUTANEOUS at 07:04

## 2018-12-17 RX ADMIN — Medication 100 MILLIGRAM(S): at 13:09

## 2018-12-17 RX ADMIN — Medication 325 MILLIGRAM(S): at 07:04

## 2018-12-17 RX ADMIN — Medication 3 MILLILITER(S): at 07:03

## 2018-12-17 RX ADMIN — PANTOPRAZOLE SODIUM 40 MILLIGRAM(S): 20 TABLET, DELAYED RELEASE ORAL at 07:04

## 2018-12-17 RX ADMIN — AMIODARONE HYDROCHLORIDE 200 MILLIGRAM(S): 400 TABLET ORAL at 07:04

## 2018-12-17 RX ADMIN — Medication 500 MILLIGRAM(S): at 07:04

## 2018-12-17 RX ADMIN — Medication 100 MILLIGRAM(S): at 07:04

## 2018-12-19 ENCOUNTER — APPOINTMENT (OUTPATIENT)
Age: 83
End: 2018-12-19
Payer: COMMERCIAL

## 2018-12-19 VITALS — HEART RATE: 82 BPM | DIASTOLIC BLOOD PRESSURE: 84 MMHG | SYSTOLIC BLOOD PRESSURE: 180 MMHG | RESPIRATION RATE: 16 BRPM

## 2018-12-19 LAB
ALBUMIN SERPL ELPH-MCNC: 3.7 G/DL
ALP BLD-CCNC: 71 U/L
ALT SERPL-CCNC: 18 U/L
ANION GAP SERPL CALC-SCNC: 10 MMOL/L
AST SERPL-CCNC: 20 U/L
BASOPHILS # BLD AUTO: 0.01 K/UL
BASOPHILS NFR BLD AUTO: 0.1 %
BILIRUB SERPL-MCNC: 0.3 MG/DL
BUN SERPL-MCNC: 37 MG/DL
CALCIUM SERPL-MCNC: 9.4 MG/DL
CHLORIDE SERPL-SCNC: 108 MMOL/L
CHOLEST SERPL-MCNC: 136 MG/DL
CHOLEST/HDLC SERPL: 3 RATIO
CO2 SERPL-SCNC: 25 MMOL/L
CREAT SERPL-MCNC: 1.61 MG/DL
EOSINOPHIL # BLD AUTO: 0.12 K/UL
EOSINOPHIL NFR BLD AUTO: 1.6 %
GLUCOSE SERPL-MCNC: 126 MG/DL
HCT VFR BLD CALC: 31 %
HDLC SERPL-MCNC: 46 MG/DL
HGB BLD-MCNC: 10 G/DL
IMM GRANULOCYTES NFR BLD AUTO: 0.1 %
LDLC SERPL CALC-MCNC: 73 MG/DL
LDLC SERPL DIRECT ASSAY-MCNC: 75 MG/DL
LYMPHOCYTES # BLD AUTO: 0.94 K/UL
LYMPHOCYTES NFR BLD AUTO: 12.7 %
MAGNESIUM SERPL-MCNC: 1.9 MG/DL
MAN DIFF?: NORMAL
MCHC RBC-ENTMCNC: 29.8 PG
MCHC RBC-ENTMCNC: 32.3 GM/DL
MCV RBC AUTO: 92.3 FL
MONOCYTES # BLD AUTO: 0.57 K/UL
MONOCYTES NFR BLD AUTO: 7.7 %
NEUTROPHILS # BLD AUTO: 5.76 K/UL
NEUTROPHILS NFR BLD AUTO: 77.8 %
PLATELET # BLD AUTO: 197 K/UL
POTASSIUM SERPL-SCNC: 5 MMOL/L
PROT SERPL-MCNC: 6.5 G/DL
RBC # BLD: 3.36 M/UL
RBC # FLD: 14.4 %
SODIUM SERPL-SCNC: 143 MMOL/L
T3 SERPL-MCNC: 108 NG/DL
T3RU NFR SERPL: 1.09 INDEX
T4 FREE SERPL-MCNC: 1.3 NG/DL
T4 SERPL-MCNC: 7.4 UG/DL
TRIGL SERPL-MCNC: 84 MG/DL
TSH SERPL-ACNC: 2.05 UIU/ML
WBC # FLD AUTO: 7.41 K/UL

## 2018-12-19 PROCEDURE — 99024 POSTOP FOLLOW-UP VISIT: CPT

## 2018-12-23 ENCOUNTER — MESSAGE (OUTPATIENT)
Age: 83
End: 2018-12-23

## 2018-12-24 ENCOUNTER — CLINICAL ADVICE (OUTPATIENT)
Age: 83
End: 2018-12-24

## 2018-12-27 RX ORDER — METOPROLOL SUCCINATE 100 MG/1
100 TABLET, EXTENDED RELEASE ORAL
Qty: 90 | Refills: 3 | Status: DISCONTINUED | COMMUNITY
Start: 2017-11-08 | End: 2018-12-27

## 2018-12-27 RX ORDER — METOPROLOL TARTRATE 50 MG/1
50 TABLET, FILM COATED ORAL
Qty: 60 | Refills: 0 | Status: ACTIVE | COMMUNITY

## 2018-12-27 RX ORDER — FOLIC ACID 1 MG/1
1 TABLET ORAL DAILY
Refills: 0 | Status: ACTIVE | COMMUNITY

## 2018-12-27 RX ORDER — AMIODARONE HYDROCHLORIDE 200 MG/1
200 TABLET ORAL
Qty: 90 | Refills: 1 | Status: ACTIVE | COMMUNITY

## 2018-12-27 RX ORDER — PANTOPRAZOLE SODIUM 40 MG/1
40 TABLET, DELAYED RELEASE ORAL
Refills: 0 | Status: ACTIVE | COMMUNITY

## 2018-12-27 RX ORDER — ASCORBIC ACID 500 MG
500 TABLET ORAL TWICE DAILY
Refills: 0 | Status: ACTIVE | COMMUNITY

## 2018-12-27 RX ORDER — CHLORHEXIDINE GLUCONATE 4 %
325 (65 FE) LIQUID (ML) TOPICAL 3 TIMES DAILY
Refills: 0 | Status: ACTIVE | COMMUNITY

## 2018-12-31 ENCOUNTER — APPOINTMENT (OUTPATIENT)
Dept: CARDIOTHORACIC SURGERY | Facility: CLINIC | Age: 83
End: 2018-12-31
Payer: COMMERCIAL

## 2018-12-31 VITALS
HEART RATE: 60 BPM | BODY MASS INDEX: 28.72 KG/M2 | WEIGHT: 182.98 LBS | RESPIRATION RATE: 14 BRPM | OXYGEN SATURATION: 97 % | SYSTOLIC BLOOD PRESSURE: 134 MMHG | DIASTOLIC BLOOD PRESSURE: 71 MMHG | HEIGHT: 67 IN | TEMPERATURE: 97.5 F

## 2018-12-31 VITALS — SYSTOLIC BLOOD PRESSURE: 133 MMHG | DIASTOLIC BLOOD PRESSURE: 56 MMHG

## 2018-12-31 VITALS — DIASTOLIC BLOOD PRESSURE: 64 MMHG | SYSTOLIC BLOOD PRESSURE: 150 MMHG

## 2018-12-31 VITALS — SYSTOLIC BLOOD PRESSURE: 135 MMHG | DIASTOLIC BLOOD PRESSURE: 53 MMHG

## 2018-12-31 DIAGNOSIS — Z95.1 PRESENCE OF AORTOCORONARY BYPASS GRAFT: ICD-10-CM

## 2018-12-31 DIAGNOSIS — I25.10 ATHEROSCLEROTIC HEART DISEASE OF NATIVE CORONARY ARTERY W/OUT ANGINA PECTORIS: ICD-10-CM

## 2018-12-31 LAB
ALBUMIN SERPL ELPH-MCNC: 3.9 G/DL
ALP BLD-CCNC: 81 U/L
ALT SERPL-CCNC: 35 U/L
ANION GAP SERPL CALC-SCNC: 14 MMOL/L
AST SERPL-CCNC: 19 U/L
BASOPHILS # BLD AUTO: 0 K/UL
BASOPHILS NFR BLD AUTO: 0.4 %
BILIRUB SERPL-MCNC: 0.4 MG/DL
BUN SERPL-MCNC: 51 MG/DL
CALCIUM SERPL-MCNC: 8.8 MG/DL
CHLORIDE SERPL-SCNC: 102 MMOL/L
CO2 SERPL-SCNC: 22 MMOL/L
CREAT SERPL-MCNC: 1.89 MG/DL
EOSINOPHIL # BLD AUTO: 0.2 K/UL
EOSINOPHIL NFR BLD AUTO: 2.2 %
GLUCOSE SERPL-MCNC: 130 MG/DL
HCT VFR BLD CALC: 27.4 %
HGB BLD-MCNC: 9.3 G/DL
LYMPHOCYTES # BLD AUTO: 0.9 K/UL
LYMPHOCYTES NFR BLD AUTO: 11.6 %
MAN DIFF?: NO
MCHC RBC-ENTMCNC: 29.7 PG
MCHC RBC-ENTMCNC: 34 GM/DL
MCV RBC AUTO: 87.3 FL
MONOCYTES # BLD AUTO: 0.7 K/UL
MONOCYTES NFR BLD AUTO: 8.2 %
NEUTROPHILS # BLD AUTO: 6.1 K/UL
NEUTROPHILS NFR BLD AUTO: 77.5 %
PLATELET # BLD AUTO: 329 K/UL
POTASSIUM SERPL-SCNC: 5.1 MMOL/L
PROT SERPL-MCNC: 6.6 G/DL
RBC # BLD: 3.14 M/UL
RBC # FLD: 13.8 %
SODIUM SERPL-SCNC: 138 MMOL/L
WBC # FLD AUTO: 7.9 K/UL

## 2018-12-31 PROCEDURE — 99024 POSTOP FOLLOW-UP VISIT: CPT

## 2018-12-31 RX ORDER — ASPIRIN 81 MG/1
81 TABLET, COATED ORAL
Qty: 30 | Refills: 0 | Status: DISCONTINUED | COMMUNITY
Start: 2018-12-17

## 2018-12-31 RX ORDER — LORATADINE 10 MG
17 TABLET,DISINTEGRATING ORAL
Refills: 0 | Status: DISCONTINUED | COMMUNITY
End: 2018-12-31

## 2018-12-31 RX ORDER — LISINOPRIL 10 MG/1
10 TABLET ORAL
Qty: 30 | Refills: 0 | Status: DISCONTINUED | COMMUNITY
Start: 2018-12-19

## 2018-12-31 RX ORDER — OXYCODONE HYDROCHLORIDE AND ACETAMINOPHEN 5; 325 MG/1; MG/1
5-325 TABLET ORAL
Qty: 30 | Refills: 0 | Status: DISCONTINUED | COMMUNITY
End: 2018-12-31

## 2018-12-31 RX ORDER — DOCUSATE SODIUM 100 MG/1
100 CAPSULE ORAL 3 TIMES DAILY
Refills: 0 | Status: DISCONTINUED | COMMUNITY
End: 2018-12-31

## 2018-12-31 RX ORDER — ATORVASTATIN CALCIUM 20 MG/1
20 TABLET, FILM COATED ORAL
Qty: 30 | Refills: 0 | Status: DISCONTINUED | COMMUNITY
Start: 2018-12-28 | End: 1900-01-01

## 2019-01-02 ENCOUNTER — RESULT REVIEW (OUTPATIENT)
Age: 84
End: 2019-01-02

## 2019-01-04 ENCOUNTER — MESSAGE (OUTPATIENT)
Age: 84
End: 2019-01-04

## 2019-01-28 ENCOUNTER — APPOINTMENT (OUTPATIENT)
Dept: CARDIOLOGY | Facility: CLINIC | Age: 84
End: 2019-01-28

## 2019-03-02 ENCOUNTER — RX RENEWAL (OUTPATIENT)
Age: 84
End: 2019-03-02

## 2019-03-04 ENCOUNTER — MEDICATION RENEWAL (OUTPATIENT)
Age: 84
End: 2019-03-04

## 2020-05-01 NOTE — PHYSICAL THERAPY INITIAL EVALUATION ADULT - RANGE OF MOTION EXAMINATION, REHAB EVAL
Mom contacted me concerning new braces.  Physical therapist thinks she needs shorter braces.  Mom sent video showing patient walking.  Will send order for bilateral SMO's to orthotist.  
bilateral upper extremity ROM was WFL (within functional limits)/bilateral lower extremity ROM was WFL (within functional limits)

## 2023-10-13 NOTE — PROGRESS NOTE ADULT - PROBLEM SELECTOR PROBLEM 3
Left UE/Right UE/Left LE/Right LE/Grossly Intact
Anemia due to blood loss
Anemia due to blood loss
Bradycardia following surgery
Hyperlipidemia, unspecified hyperlipidemia type
Left UE/Right UE/Left LE/Right LE/Grossly Intact

## 2024-02-05 NOTE — PATIENT PROFILE ADULT - BRADEN ACTIVITY
Elizabeth Tsai is a 40 year old female.  Chief Complaint   Patient presents with    Urgent Care F/u     HPI:   She presents for follow up. She went to the  on 2/3 due to having left sided groin pain. They collected a urine sample and recommended a kidney US. She was also given a shot of Toradol. She did have red cells present in the urine. She was prescribed Bactrim but did not start the medication. She was not told why she was prescribed Bactrim.     She is not having groin pain today. She denies any urinary symptoms.     She has a history of kidney stone about a few months ago.       Current Outpatient Medications   Medication Sig Dispense Refill    semaglutide 2 MG/3ML Subcutaneous Solution Pen-injector Inject 0.5 mg into the skin once a week. 3 each 0    DULoxetine 30 MG Oral Cap DR Particles Take 2 capsules (60 mg total) by mouth daily.      fenofibrate 145 MG Oral Tab Take 1 tablet (145 mg total) by mouth daily. 90 tablet 3    lisinopril-hydroCHLOROthiazide 20-25 MG Oral Tab TAKE ONE TABLET BY MOUTH ONE TIME DAILY. 90 tablet 3    metFORMIN 1000 MG Oral Tab Take 1 tablet (1,000 mg total) by mouth 2 (two) times daily with meals. 180 tablet 3    Cholecalciferol (VITAMIN D) 50 MCG (2000 UT) Oral Tab Take by oral route 2000 units daily per 30 tablet 0    FreeStyle Lancets Does not apply Misc Test blood sugar daily as directed 100 each 3    Glucose Blood (FREESTYLE TEST) In Vitro Strip Test blood sugar daily as directed 100 strip 3    FreeStyle System Does not apply Kit Test blood sugar daily as directed 1 each 0    OMEPRAZOLE 20 MG Oral Capsule Delayed Release TAKE ONE CAPSULE BY MOUTH IN THE MORNING BEFORE BREAKFAST 90 capsule 0    phenazopyridine 100 MG Oral Tab  (Patient not taking: Reported on 2/5/2024)      sulfamethoxazole-trimethoprim -160 MG Oral Tab per tablet  (Patient not taking: Reported on 2/5/2024)        Past Medical History:   Diagnosis Date    ADHD     Anxiety     Cancer (HCC)      endometrial, s/p robotic hyst/BSO    Depression     Diabetes (HCC)     Hemorrhoid     Morbid obesity with BMI of 40.0-44.9, adult (HCC)     Obesity, unspecified     Pre-diabetes     Unspecified essential hypertension       Past Surgical History:   Procedure Laterality Date    HYSTERECTOMY  2013    TONSILLECTOMY        Social History:  Social History     Socioeconomic History    Marital status:    Tobacco Use    Smoking status: Never    Smokeless tobacco: Never   Vaping Use    Vaping Use: Never used   Substance and Sexual Activity    Alcohol use: Not Currently     Comment: rarely    Drug use: No   Other Topics Concern    Caffeine Concern Yes     Comment: Chocolate daily;       Family History   Problem Relation Age of Onset    Diabetes Mother     Hypertension Mother     Lipids Mother         Hyperlipidemia    Thyroid Disorder Mother         Hypothyroidism    Pulmonary Disease Mother         COPD    Anemia Mother     Asthma Mother     Depression Mother     Obesity Mother     Breast Cancer Maternal Grandmother     Depression Maternal Grandmother     Breast Cancer Maternal Aunt     Breast Cancer Maternal Cousin Female     Cancer Father         kidney cancer    Diabetes Maternal Grandfather     Hypertension Maternal Grandfather     Obesity Maternal Grandfather     Cancer Paternal Grandfather     Dementia Paternal Grandmother     Asthma Brother     Obesity Brother       No Known Allergies     REVIEW OF SYSTEMS:     Review of Systems   Constitutional:  Negative for fever.   HENT: Negative.     Respiratory:  Negative for cough, shortness of breath and wheezing.    Gastrointestinal:  Negative for abdominal pain.   Genitourinary:  Negative for dysuria, frequency and hematuria.   Musculoskeletal: Negative.    Skin: Negative.    Neurological: Negative.    Psychiatric/Behavioral: Negative.        Wt Readings from Last 5 Encounters:   02/05/24 243 lb (110.2 kg)   01/29/24 246 lb (111.6 kg)   12/20/23 251 lb (113.9 kg)    11/14/23 251 lb (113.9 kg)   11/09/23 255 lb (115.7 kg)     Body mass index is 36.95 kg/m².      EXAM:   /89   Pulse 93   Resp 16   Ht 5' 8\" (1.727 m)   Wt 243 lb (110.2 kg)   BMI 36.95 kg/m²     Physical Exam  Vitals reviewed.   Constitutional:       Appearance: Normal appearance.   HENT:      Head: Normocephalic.   Cardiovascular:      Rate and Rhythm: Normal rate and regular rhythm.      Pulses: Normal pulses.   Pulmonary:      Breath sounds: Normal breath sounds. No wheezing.   Abdominal:      General: Bowel sounds are normal.      Palpations: Abdomen is soft.      Tenderness: There is no abdominal tenderness.   Musculoskeletal:         General: No swelling. Normal range of motion.      Cervical back: Normal range of motion and neck supple.   Skin:     General: Skin is warm and dry.   Neurological:      Mental Status: She is alert and oriented to person, place, and time.   Psychiatric:         Mood and Affect: Mood normal.         Behavior: Behavior normal.            ASSESSMENT AND PLAN:   1. History of UTI  - Urinalysis with Culture Reflex    2. Hematuria, unspecified type  - UA Microscopic only, urine [E]; Future  - Urinalysis with Culture Reflex  - XR ABDOMEN (1 VIEW) (CPT=74018); Future      The patient indicates understanding of these issues and agrees to the plan.  Return for if symptoms do not resolve.   (3) walks occasionally

## 2024-05-17 NOTE — H&P CARDIOLOGY - CIGARETTES, NUMBER OF YRS
[facility]: [facility]  PROGRESS NOTE      Erin Perez is a 66 y.o. male that is presenting today for Follow-up (Lab results).    Assessment/Plan   Diagnoses and all orders for this visit:  Primary hypertension    Due to worsening of the kidney function will discontinue the current BP meds and start new ones  -     amLODIPine (Norvasc) 10 mg tablet; Take 1 tablet (10 mg) by mouth once daily.  -     bisoproloL-hydrochlorothiazide (Ziac) 5-6.25 mg tablet; Take 1 tablet by mouth once daily.  Stage 3a chronic kidney disease (Multi)     - Stable / Continue to monitor    - Stressed importance of hydration and avoiding Nephrotoxic meds mostly OTC NSAIDs  -     Referral to Nephrology; Future  -     Renal function panel; Future  Pre-diabetes       Low CH diet and Exercise  Mixed hyperlipidemia       Under control with current treatment   Continue the same      Subjective     - Erin Perez 66 y.o. male patient of (Danny) is here today for FUV, BW results and Refills       - Patient denies any symptoms or concerns at this time.       - patient denies any adverse reactions to or concerns with his/her meds.       - Problem list and medication reconciliation done today.  - Patient did not require labwork for this appointment.  - Patient has already had labs ordered for their next appointment.  - V.S. Stable. No changes at this time.  - Encouraged continued diet and exercise modification.          Review of Systems   All pertinent POSITIVES as noted per HPI.  All other systems have been reviewed and are NEGATIVE and /or Noncontributory to this patient current visit or complaint.    Objective   Vitals:    05/17/24 1004   BP: 129/80   Pulse: 69   Temp: 36.4 °C (97.5 °F)   SpO2: 96%      Body mass index is 22.96 kg/m².  Physical Exam  Vitals and nursing note reviewed.   Constitutional:       Appearance: Normal appearance.   HENT:      Head: Normocephalic and atraumatic.   Neck:      Vascular: No  "carotid bruit.   Cardiovascular:      Rate and Rhythm: Normal rate and regular rhythm.      Pulses: Normal pulses.      Heart sounds: Normal heart sounds.   Pulmonary:      Effort: Pulmonary effort is normal.      Breath sounds: Normal breath sounds.   Abdominal:      General: Abdomen is flat. Bowel sounds are normal.      Palpations: Abdomen is soft.   Musculoskeletal:         General: No swelling. Normal range of motion.      Cervical back: Neck supple.   Lymphadenopathy:      Cervical: No cervical adenopathy.   Skin:     General: Skin is warm and dry.   Neurological:      Mental Status: He is alert.   Psychiatric:         Mood and Affect: Mood normal.     Diagnostic Results   Lab Results   Component Value Date    GLUCOSE 74 05/02/2024    CALCIUM 10.6 05/02/2024     05/02/2024    K 5.1 05/02/2024    CO2 29 05/02/2024     05/02/2024    BUN 23 05/02/2024    CREATININE 1.51 (H) 05/02/2024     Lab Results   Component Value Date    ALT 11 05/01/2023    AST 15 05/01/2023    ALKPHOS 106 05/01/2023    BILITOT 0.4 05/01/2023     Lab Results   Component Value Date    WBC 8.0 05/01/2023    HGB 14.8 05/01/2023    HCT 46.8 05/01/2023    MCV 93 05/01/2023     05/01/2023     Lab Results   Component Value Date    CHOL 111 05/01/2023    CHOL 116 08/25/2022    CHOL 116 11/05/2021     Lab Results   Component Value Date    HDL 36.7 (A) 05/01/2023    HDL 36.7 (A) 08/25/2022    HDL 30.6 (A) 11/05/2021     No results found for: \"LDLCALC\"  Lab Results   Component Value Date    TRIG 76 05/01/2023    TRIG 73 08/25/2022    TRIG 80 11/05/2021     No components found for: \"CHOLHDL\"  Lab Results   Component Value Date    HGBA1C 5.7 (A) 08/24/2023     Other labs not included in the list above were reviewed either before or during this encounter.    History    Past Medical History:   Diagnosis Date    Abnormal levels of other serum enzymes 04/04/2016    Alkaline phosphatase elevation    Body mass index (BMI) 23.0-23.9, adult " 10/22/2021    BMI 23.0-23.9, adult    Body mass index (BMI) 24.0-24.9, adult 08/12/2019    BMI 24.0-24.9, adult    Body mass index (BMI) 25.0-25.9, adult 04/29/2021    BMI 25.0-25.9,adult    Encounter for screening for cardiovascular disorders 01/24/2020    Encounter for screening for cardiovascular disorders    Encounter for screening for malignant neoplasm of colon 06/15/2020    Encounter for screening colonoscopy    Encounter for screening for malignant neoplasm of prostate 06/15/2020    Encounter for prostate cancer screening    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    Low HDL (under 40)    Other microscopic hematuria 02/16/2018    Hematuria, microscopic    Other specified abdominal hernia with obstruction, without gangrene 08/07/2020    Other specified abdominal hernia with obstruction and without gangrene    Personal history of diseases of the skin and subcutaneous tissue 07/22/2013    History of dermatitis    Personal history of other diseases of the circulatory system 02/11/2019    History of intermittent claudication    Personal history of other specified conditions 07/09/2013    History of fatigue    Personal history of pneumonia (recurrent) 01/20/2021    History of community acquired pneumonia     Past Surgical History:   Procedure Laterality Date    AORTA - BILATERAL FEMORAL ARTERY BYPASS GRAFT Bilateral     IR ANGIOGRAM AORTA ABDOMEN  04/19/2018    IR ANGIOGRAM AORTA ABDOMEN 4/19/2018 [facility]    IR INTERVENTION NEURO STENT  04/19/2018    IR INTERVENTION NEURO STENT 4/19/2018 [facility]     No family history on file.  Social History     Socioeconomic History    Marital status:      Spouse name: Not on file    Number of children: Not on file    Years of education: Not on file    Highest education level: Not on file   Occupational History    Not on file   Tobacco Use    Smoking status: Former     Current packs/day: 0.00     Types: Cigarettes     Quit date: 2024     Years since  quittin.3     Passive exposure: Past    Smokeless tobacco: Never   Vaping Use    Vaping status: Never Used   Substance and Sexual Activity    Alcohol use: Yes     Comment: Socially    Drug use: Yes     Types: Marijuana    Sexual activity: Not on file   Other Topics Concern    Not on file   Social History Narrative    Not on file     Social Determinants of Health     Financial Resource Strain: Not on file   Food Insecurity: Not on file   Transportation Needs: Not on file   Physical Activity: Not on file   Stress: No Stress Concern Present (2023)    [employer] - Occupational Stress Questionnaire     Feeling of Stress : Not at all   Social Connections: Not on file   Intimate Partner Violence: Not At Risk (2023)    Humiliation, Afraid, Rape, and Kick questionnaire     Fear of Current or Ex-Partner: No     Emotionally Abused: No     Physically Abused: No     Sexually Abused: No   Housing Stability: Unknown (2023)    Housing Stability Vital Sign     Unable to Pay for Housing in the Last Year: No     Number of Places Lived in the Last Year: Not on file     Unstable Housing in the Last Year: No     No Known Allergies  Current Outpatient Medications on File Prior to Visit   Medication Sig Dispense Refill    albuterol (Ventolin HFA) 90 mcg/actuation inhaler Inhale 2 puffs every 4 hours if needed for wheezing. 54 g 1    amLODIPine (Norvasc) 5 mg tablet TAKE 1 TABLET BY MOUTH EVERY DAY 90 tablet 1    aspirin 81 mg EC tablet TAKE 1 TABLET BY MOUTH ONCE DAILY. 90 tablet 0    atorvastatin (Lipitor) 40 mg tablet Take 1 tablet (40 mg) by mouth once daily. 90 tablet 1    lisinopril 40 mg tablet TAKE 1 TABLET BY MOUTH EVERY DAY 30 tablet 3    albuterol 90 mcg/actuation inhaler INHALE 2 PUFFS BY MOUTH EVERY 4 HOURS IF NEEDED FOR WHEEZING. 8 g 1    fluticasone propion-salmeteroL (Advair Diskus) 500-50 mcg/dose diskus inhaler Inhale 1 puff 2 times a day. Rinse mouth with water after use to  reduce aftertaste and incidence of candidiasis. Do not swallow. 60 each 11     No current facility-administered medications on file prior to visit.     There is no immunization history for the selected administration types on file for this patient.  Patient's medical history was reviewed and updated either before or during this encounter.       Juliann Manjarrez MD   5

## 2025-05-08 ENCOUNTER — OFFICE (OUTPATIENT)
Dept: URBAN - METROPOLITAN AREA CLINIC 12 | Facility: CLINIC | Age: OVER 89
Setting detail: OPHTHALMOLOGY
End: 2025-05-08
Payer: MEDICARE

## 2025-05-08 DIAGNOSIS — Z96.1: ICD-10-CM

## 2025-05-08 DIAGNOSIS — H35.3132: ICD-10-CM

## 2025-05-08 DIAGNOSIS — H25.11: ICD-10-CM

## 2025-05-08 DIAGNOSIS — H18.513: ICD-10-CM

## 2025-05-08 DIAGNOSIS — H53.001: ICD-10-CM

## 2025-05-08 DIAGNOSIS — H40.033: ICD-10-CM

## 2025-05-08 PROCEDURE — 92014 COMPRE OPH EXAM EST PT 1/>: CPT | Performed by: OPHTHALMOLOGY

## 2025-05-08 PROCEDURE — 92250 FUNDUS PHOTOGRAPHY W/I&R: CPT | Performed by: OPHTHALMOLOGY

## 2025-05-08 ASSESSMENT — REFRACTION_CURRENTRX
OD_AXIS: 160
OD_SPHERE: +0.50
OD_OVR_VA: 20/
OD_CYLINDER: -0.75
OS_OVR_VA: 20/
OD_VPRISM_DIRECTION: BF
OS_CYLINDER: -0.75
OD_ADD: +2.75
OS_SPHERE: -0.50
OS_ADD: +2.75
OS_AXIS: 134
OS_VPRISM_DIRECTION: BF

## 2025-05-08 ASSESSMENT — CONFRONTATIONAL VISUAL FIELD TEST (CVF)
OS_FINDINGS: FULL
OD_FINDINGS: FULL

## 2025-05-08 ASSESSMENT — REFRACTION_MANIFEST
OS_AXIS: 076
OD_CYLINDER: -0.75
OD_VA1: 20/200
OS_SPHERE: -0.50
OS_VA1: 20/40-2
OD_SPHERE: +0.50
OS_CYLINDER: -1.25
OD_AXIS: 160

## 2025-05-08 ASSESSMENT — TONOMETRY
OD_IOP_MMHG: 15
OS_IOP_MMHG: 16

## 2025-05-08 ASSESSMENT — VISUAL ACUITY
OS_BCVA: 20/70
OD_BCVA: 20/40-2

## 2025-05-08 ASSESSMENT — REFRACTION_AUTOREFRACTION
OS_CYLINDER: -1.25
OS_SPHERE: -0.50
OD_SPHERE: UNABLE
OS_AXIS: 076

## 2025-05-08 ASSESSMENT — CORNEAL DYSTROPHY - POSTERIOR
OD_POSTERIORDYSTROPHY: GUTTATA
OS_POSTERIORDYSTROPHY: GUTTATA

## 2025-05-08 ASSESSMENT — KERATOMETRY
OS_K1POWER_DIOPTERS: 43.50
OD_K1POWER_DIOPTERS: UNABLE
METHOD_AUTO_MANUAL: AUTO
OS_AXISANGLE_DEGREES: 020
OS_K2POWER_DIOPTERS: 45.00